# Patient Record
Sex: MALE | Race: WHITE | NOT HISPANIC OR LATINO | Employment: OTHER | URBAN - METROPOLITAN AREA
[De-identification: names, ages, dates, MRNs, and addresses within clinical notes are randomized per-mention and may not be internally consistent; named-entity substitution may affect disease eponyms.]

---

## 2023-01-19 ENCOUNTER — TRANSFERRED RECORDS (OUTPATIENT)
Dept: HEALTH INFORMATION MANAGEMENT | Facility: CLINIC | Age: 78
End: 2023-01-19

## 2023-01-27 ENCOUNTER — TRANSFERRED RECORDS (OUTPATIENT)
Dept: HEALTH INFORMATION MANAGEMENT | Facility: CLINIC | Age: 78
End: 2023-01-27

## 2023-02-22 ENCOUNTER — TRANSFERRED RECORDS (OUTPATIENT)
Dept: HEALTH INFORMATION MANAGEMENT | Facility: CLINIC | Age: 78
End: 2023-02-22

## 2023-03-08 ENCOUNTER — TRANSFERRED RECORDS (OUTPATIENT)
Dept: HEALTH INFORMATION MANAGEMENT | Facility: CLINIC | Age: 78
End: 2023-03-08

## 2023-05-08 ENCOUNTER — TRANSFERRED RECORDS (OUTPATIENT)
Dept: HEALTH INFORMATION MANAGEMENT | Facility: CLINIC | Age: 78
End: 2023-05-08

## 2023-05-12 ENCOUNTER — TRANSFERRED RECORDS (OUTPATIENT)
Dept: HEALTH INFORMATION MANAGEMENT | Facility: CLINIC | Age: 78
End: 2023-05-12

## 2023-05-23 ENCOUNTER — MEDICAL CORRESPONDENCE (OUTPATIENT)
Dept: HEALTH INFORMATION MANAGEMENT | Facility: CLINIC | Age: 78
End: 2023-05-23

## 2023-06-22 ENCOUNTER — TRANSFERRED RECORDS (OUTPATIENT)
Dept: HEALTH INFORMATION MANAGEMENT | Facility: CLINIC | Age: 78
End: 2023-06-22

## 2023-06-30 ENCOUNTER — TRANSFERRED RECORDS (OUTPATIENT)
Dept: HEALTH INFORMATION MANAGEMENT | Facility: CLINIC | Age: 78
End: 2023-06-30

## 2023-07-01 ENCOUNTER — TRANSFERRED RECORDS (OUTPATIENT)
Dept: HEALTH INFORMATION MANAGEMENT | Facility: CLINIC | Age: 78
End: 2023-07-01

## 2023-07-05 ENCOUNTER — TRANSFERRED RECORDS (OUTPATIENT)
Dept: HEALTH INFORMATION MANAGEMENT | Facility: CLINIC | Age: 78
End: 2023-07-05

## 2023-07-26 ENCOUNTER — TRANSFERRED RECORDS (OUTPATIENT)
Dept: HEALTH INFORMATION MANAGEMENT | Facility: CLINIC | Age: 78
End: 2023-07-26

## 2023-07-27 ENCOUNTER — TRANSFERRED RECORDS (OUTPATIENT)
Dept: HEALTH INFORMATION MANAGEMENT | Facility: CLINIC | Age: 78
End: 2023-07-27

## 2023-07-28 ENCOUNTER — TRANSFERRED RECORDS (OUTPATIENT)
Dept: HEALTH INFORMATION MANAGEMENT | Facility: CLINIC | Age: 78
End: 2023-07-28

## 2023-08-14 ENCOUNTER — TRANSFERRED RECORDS (OUTPATIENT)
Dept: HEALTH INFORMATION MANAGEMENT | Facility: CLINIC | Age: 78
End: 2023-08-14

## 2023-09-07 ENCOUNTER — TRANSFERRED RECORDS (OUTPATIENT)
Dept: HEALTH INFORMATION MANAGEMENT | Facility: CLINIC | Age: 78
End: 2023-09-07

## 2023-10-02 ENCOUNTER — TRANSFERRED RECORDS (OUTPATIENT)
Dept: HEALTH INFORMATION MANAGEMENT | Facility: CLINIC | Age: 78
End: 2023-10-02

## 2023-10-06 ENCOUNTER — TRANSFERRED RECORDS (OUTPATIENT)
Dept: HEALTH INFORMATION MANAGEMENT | Facility: CLINIC | Age: 78
End: 2023-10-06

## 2023-11-27 ENCOUNTER — TRANSFERRED RECORDS (OUTPATIENT)
Dept: HEALTH INFORMATION MANAGEMENT | Facility: CLINIC | Age: 78
End: 2023-11-27

## 2023-12-11 ENCOUNTER — TRANSFERRED RECORDS (OUTPATIENT)
Dept: HEALTH INFORMATION MANAGEMENT | Facility: CLINIC | Age: 78
End: 2023-12-11

## 2024-01-10 ENCOUNTER — TRANSFERRED RECORDS (OUTPATIENT)
Dept: HEALTH INFORMATION MANAGEMENT | Facility: CLINIC | Age: 79
End: 2024-01-10

## 2024-01-29 ENCOUNTER — TRANSFERRED RECORDS (OUTPATIENT)
Dept: HEALTH INFORMATION MANAGEMENT | Facility: CLINIC | Age: 79
End: 2024-01-29

## 2024-01-31 ENCOUNTER — TRANSFERRED RECORDS (OUTPATIENT)
Dept: HEALTH INFORMATION MANAGEMENT | Facility: CLINIC | Age: 79
End: 2024-01-31

## 2024-04-03 ENCOUNTER — TRANSFERRED RECORDS (OUTPATIENT)
Dept: HEALTH INFORMATION MANAGEMENT | Facility: CLINIC | Age: 79
End: 2024-04-03
Payer: MEDICARE

## 2024-04-05 ENCOUNTER — MEDICAL CORRESPONDENCE (OUTPATIENT)
Dept: HEALTH INFORMATION MANAGEMENT | Facility: CLINIC | Age: 79
End: 2024-04-05
Payer: MEDICARE

## 2024-04-05 ENCOUNTER — TRANSCRIBE ORDERS (OUTPATIENT)
Dept: OTHER | Age: 79
End: 2024-04-05

## 2024-04-05 DIAGNOSIS — C17.1 MALIGNANT NEOPLASM OF JEJUNUM (H): Primary | ICD-10-CM

## 2024-04-08 ENCOUNTER — PATIENT OUTREACH (OUTPATIENT)
Dept: ONCOLOGY | Facility: CLINIC | Age: 79
End: 2024-04-08

## 2024-04-08 NOTE — PROGRESS NOTES
New Patient Oncology Nurse Navigator Note     Referring provider: Dr Ronald Goldberg, Oncology    Referring Clinic/Organization: Other - [facility]     Referred to: Medical Oncology    Requested provider (if applicable): Dr Fregoso or colleagues    Referral Received: 04/05/24       Evaluation for : colon cancer     Clinical History (per Nurse review of records provided):      * 6/30/2023 ([facility])    CT Abdomen Pelvis w Contrast  Order: [id number]  Impression      1.  The duodenum and proximal jejunum are dilated with a short segment of normal   caliber small bowel in the right lower quadrant before another loop of dilated   small bowel is seen in the left abdomen. There is suggestion of a transition   point in the mid abdomen in the region of small bowel wall thickening with   appearance suggestive of a mass. These findings are concerning for early or   partial small bowel obstruction versus ileus given patient's reported history of   recent GI procedure.   2.  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          * 6/30/2023 exploratory laparotomy with jejunal mass resection, biopsy ([facility])    Addendum     MLH1 methylation:  Detected.     See attached MLH1 promoter methylation analysis report from [employer] for details.   Addendum electronically signed by Christina Patton MD on 10/12/2023 at 1804   Final Diagnosis     A. Mesentery, biopsy:  Adenocarcinoma.     B: Jejunum, SMALL BOWEL MASS, resection:  Adenocarcinoma, SEE COMMENT.  Comment:  The operative note and H&E review and immunohistochemistry are consistent with a small bowel primary. Of note, PMS2, MSH6, MSH2 are all intact, and MLH1 is positive, but an unusual pattern. In light of the location of this carcinoma, and that finding, additional testing has been requested and will be  reported as an addendum.      Electronically signed by Karan Perales MD for Christina Patton MD on 7/7/2023 at 0956         pT Category:    pT3   pN Category:    pN1       * 9/7/2023 US upper left extremity ([facility])  Impression      DVT involving the left brachial vein.     Findings discussed with Dr. Diallo by sonographer, April Page at end of the   exam.     Interpreted By: Debby Rodriguez MD   9/7/2023 5:14 PM                                 * 12/14/2023 MR Abd Pelvis ([place])  Impression    Decreased size of the 1.6 cm left mesenteric mass. No other sites of disease in the abdomen or pelvis.       * 12/15/2023 Pt met / [place] Dr Stanford for 2nd opinion.    ASSESSMENT/PLAN  1. 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 Found to have pMMR/MSI-H disease and switch to immunotherapy. Restaging scan shows response to treatment with out evidence of progression.  - Given MSI-H disease recommendation is to proceed with monthly Nivolumab. Continue restaging scans every 3 months. At the 1-2 year crystal from the start of immunotherapy based on residual disease and PET activity can decide whether immunotherapy can be stopped.  - Given MSI-H disease and response to immunotherapy, reasonable to hold off on surgical resection. Patient would like to defer surgical consult at this time.    Followup: As needed.      * [facility]/2024 [facility]            Clinical Assessment / Barriers to Care (Per Nurse):    Per Dr Goldberg's notes, pt continues on maintenance nivolumab, last dose give 4/4/2024. Pt is moving to [place] at the end of April 2024 & would like to transfer care to Dr Fregoso.    Will offer next available consult w/ Dr Fregoso (5/6/2024) or schedule per pt preference per travel plans.        Records Location: [facility] Everywhere   Faxed - Media tab/Scanned     Records Needed:     Outside imaging from [place] 6/2023 to  present, outside path slides from [place] for colon cancer    Additional testing needed prior to consult:     N/A          Bernardino Reyna, RN, BSN, OCN  Oncology New Patient Nurse Navigator   [facility]  [phone]

## 2024-04-24 ENCOUNTER — TRANSFERRED RECORDS (OUTPATIENT)
Dept: HEALTH INFORMATION MANAGEMENT | Facility: CLINIC | Age: 79
End: 2024-04-24
Payer: MEDICARE

## 2024-05-07 NOTE — TELEPHONE ENCOUNTER
Call to patient and was given results from PCP lesions removed were benign ,no cancer.    Verbalized understanding with .   No further questions.   RECORDS STATUS - ALL OTHER DIAGNOSIS      Referring Provider/Location:  Dr. Ronald Goldberg MD at [facility] [address] [place] [address]. Ph. [phone] Fax. [phone]   Dx and Code: Malignant neoplasm of jejunum (H) [C17.1]   Appt Date:  5.13.24  Provider: Hima Fregoso     Action Taken  Date/Description  TJ     PreVisit May 7, 2024  9:13 AM   Call to Pt and confirmed the following:  Records updated in Care Everywhere: Yes  Has Pt been anywhere else for this condition/concern? Yes  If yes, where?  [facility] (PT unsure of where but believes he is affiliated with [facility])  Prior history of Hematologist, Oncologist: No  Previous Radiation:  No  If yes, when, where and what part of body: NA  Genetic Testing conducted:  Yes around 8/2023 - [facility] or [facility] (Genetic and Genomic)  Previous Surgical procedures or biopsies:  6.30.23  [facility]: [facility]  Any scheduled follow up's/imaging/surgical procedures/biopsies?  No  Any outstanding orders/planning to schedule:  No    Call to [facility] for Medical Records Team to CB  Call to [facility] to obtain fax#  Recording said to call [phone]    Provided F: [phone]  P: [phone]  Address:  [facility] Pathology Dept [address] [place] [address]  [id number]  Call to [facility] -[phone] and obtained Fax   F: [phone]      May 10, 2024 11:19 AM  Message to NN with update on status of records  May 13, 2024 3:00 PM  Call from Angel at [facility] medical records stating the request needs to be faxed to   The reports will be faxed but the images have been done at several locations-[facility] and [facility] are two different facilities   They can give get us the imaging from the PET done on 1.29.24 but need to call [phone].  This is the radiation Oncology Dept but all  imaging is done there. The reports are being faxed over now.  Called and no one is available to talk to  - need to CB  May 14, 2024 10:23 AM Call to Rad Onc and Pt has never been there.  Need to call Dr Goldbergs office at [phone].  Called and this is the same place that called me yesterday.  Called and spoke with Angel again and he gave me a phone number for [facility]  Radiology [phone].  He is going to try and find the location of [place].  Person that answered gave me medical records number of [phone].  This took me back to the same place I was transferred again.  Since the fax number seems to be the same as [place], I just called and LVM for Mitali in  to call me and let me know if she can help.  11:07 AM  CB from Angel and he was able to provide me with a number for Jacquelin who is able to get images on a disc for  me.  I just need to call her and tell her what I need.  The number is [phone]    Call to Jacquelin and she will need a new shipping label:  P: [phone]  F: [phone]  Address:  [facility]   Imaging Dept  [address] [place] [address]    May 16, 2024 2:47 PM  I sent an IB to Pa earlier to day to let her know that I have had multiple failed attempts and am transferring out. Forwarding info to Dr Fregoso     Records Requested     Clinic name Comments/Action Taken   [facility] May 7, 2024 10:35 AM    Faxed request for Operative Report and Pathology Slides   Trk: [phone]  May 9, 2024 1:10 PM  Called and Fax request not received.  Need to re-fax to [phone] (Done)  May 10, 2024 10:43 AM  Called to [facility] and they confirmed path has been sent today  May 13, 2024  Tracking shows en route   [facility]   May 7, 2024 10:08 AM    Faxed request for all records and imaging on a DiCom Disc    New Next Day Trk:  [phone]  Called [phone] and requested imaging dept.  They said Medical records handles  imaging.  P: [phone]    May 10, 2024 Called and only had recording giving a different fax number to send request to.  [phone]. Re-faxed request  May 14, 2024 9:17 AM  Records received (4 faxes); Emailed to NN and faxed to HIM   [facility] May 14, 2024 11:34 AM  Faxed STAT request with next day label for all imaging  Trk:  [phone]  May 16, 2024 10:32 AM  Tracking shows still not shipped   [facility] May 7, 2024  10:56 AM  Faxed request for all imaging on a DiCom Disc  Trk: [phone]  May 10, 2024 10:48 AM  Call to Medical records (they do films) and they have no record of receipt.  Provided same fax I used earlier.  Re-faxed to [phone]  May 14, 2024  Call to follow-up on status of image request.  On hold for 11 minutes   May 16, 2024 10:33 AM Tracking shows still not shipped     RECORDS NEEDED: All per protocol   NOTES STATUS COMMENTS   OFFICE NOTE from referring provider     OFFICE NOTE from medical oncologist [facility] 12.19.23-4.24.24    12.15.23   OFFICE NOTE from other specialist Epic/ - [facility]    DISCHARGE SUMMARY from hospital /[facility] 9.11.23 Neutropenic Fever  6.30.23 Small Bowel Obstruction (See operative report)   DISCHARGE REPORT from the ER     OPERATIVE REPORT [facility] 6.30.23 Laparoscopic/open Laparotomy Jejunal Mass  5.12.23 Upper GI   MEDICATION LIST     LABS     PATHOLOGY REPORTS [facility]  P: [phone] 6.30.23 Case: [id number] Jejunum    ANYTHING RELATED TO DIAGNOSIS [facility] 4.5.24 Most recent labs  9.13.23 Labs   GENONOMIC TESTING     TYPE: [facility]  10.2024 [place]    IMAGING (NEED IMAGES & REPORT)     CT  [facility]  10.26.23 CAP  6.30.23 Abdomen Pelvis   MRI [facility]/[place] 12.14.23 Abdomen  12.14.23 Pelvis   PET [facility]  [facility] 1.29.24 Eyes to Thighs  8.11.23 Eyes to Thighs

## 2024-05-13 ENCOUNTER — PRE VISIT (OUTPATIENT)
Dept: ONCOLOGY | Facility: CLINIC | Age: 79
End: 2024-05-13
Payer: MEDICARE

## 2024-05-13 ENCOUNTER — PATIENT OUTREACH (OUTPATIENT)
Dept: ONCOLOGY | Facility: CLINIC | Age: 79
End: 2024-05-13
Payer: MEDICARE

## 2024-05-13 ENCOUNTER — ONCOLOGY VISIT (OUTPATIENT)
Dept: ONCOLOGY | Facility: CLINIC | Age: 79
End: 2024-05-13
Attending: INTERNAL MEDICINE
Payer: MEDICARE

## 2024-05-13 VITALS
TEMPERATURE: 98.2 F | HEIGHT: 69 IN | DIASTOLIC BLOOD PRESSURE: 81 MMHG | SYSTOLIC BLOOD PRESSURE: 134 MMHG | WEIGHT: 157 LBS | HEART RATE: 75 BPM | RESPIRATION RATE: 16 BRPM | OXYGEN SATURATION: 100 % | BODY MASS INDEX: 23.25 KG/M2

## 2024-05-13 DIAGNOSIS — R15.9 INCONTINENCE OF FECES, UNSPECIFIED FECAL INCONTINENCE TYPE: ICD-10-CM

## 2024-05-13 DIAGNOSIS — C17.1 MALIGNANT NEOPLASM OF JEJUNUM (H): Primary | ICD-10-CM

## 2024-05-13 DIAGNOSIS — E03.9 ACQUIRED HYPOTHYROIDISM: ICD-10-CM

## 2024-05-13 PROBLEM — R06.83 SNORING: Status: ACTIVE | Noted: 2018-03-30

## 2024-05-13 PROBLEM — Z87.442 HISTORY OF RENAL CALCULI: Status: ACTIVE | Noted: 2020-03-27

## 2024-05-13 PROBLEM — I65.29 CAROTID ARTERY STENOSIS: Status: ACTIVE | Noted: 2023-02-12

## 2024-05-13 PROBLEM — K63.89 SMALL BOWEL MASS: Status: ACTIVE | Noted: 2023-07-01

## 2024-05-13 PROBLEM — H52.4 PRESBYOPIA: Status: ACTIVE | Noted: 2018-03-30

## 2024-05-13 PROBLEM — C17.9: Status: ACTIVE | Noted: 2023-07-19

## 2024-05-13 PROBLEM — R97.20 HIGH PROSTATE SPECIFIC ANTIGEN (PSA): Status: ACTIVE | Noted: 2018-03-30

## 2024-05-13 PROBLEM — C17.0: Status: ACTIVE | Noted: 2023-08-04

## 2024-05-13 PROBLEM — R73.03 PREDIABETES: Status: ACTIVE | Noted: 2018-03-30

## 2024-05-13 PROBLEM — H25.10 NUCLEAR SENILE CATARACT: Status: ACTIVE | Noted: 2018-03-30

## 2024-05-13 PROBLEM — Q43.3: Status: ACTIVE | Noted: 2023-06-30

## 2024-05-13 PROCEDURE — 99417 PROLNG OP E/M EACH 15 MIN: CPT | Performed by: INTERNAL MEDICINE

## 2024-05-13 PROCEDURE — G2211 COMPLEX E/M VISIT ADD ON: HCPCS | Performed by: INTERNAL MEDICINE

## 2024-05-13 PROCEDURE — G0463 HOSPITAL OUTPT CLINIC VISIT: HCPCS | Performed by: INTERNAL MEDICINE

## 2024-05-13 PROCEDURE — 99205 OFFICE O/P NEW HI 60 MIN: CPT | Performed by: INTERNAL MEDICINE

## 2024-05-13 RX ORDER — LORAZEPAM 2 MG/ML
0.5 INJECTION INTRAMUSCULAR EVERY 4 HOURS PRN
Status: CANCELLED | OUTPATIENT
Start: 2024-05-22

## 2024-05-13 RX ORDER — HYDROCORTISONE 10 MG/1
15 TABLET ORAL
COMMUNITY
Start: 2023-10-15 | End: 2024-05-13

## 2024-05-13 RX ORDER — FLUOXETINE 10 MG/1
10 CAPSULE ORAL
COMMUNITY
Start: 2022-04-15 | End: 2024-05-13

## 2024-05-13 RX ORDER — LEVOTHYROXINE SODIUM 50 UG/1
50 TABLET ORAL DAILY
Qty: 90 TABLET | Refills: 4 | Status: SHIPPED | OUTPATIENT
Start: 2024-05-13

## 2024-05-13 RX ORDER — HYDROCORTISONE 5 MG/1
5 TABLET ORAL
COMMUNITY
Start: 2023-10-15 | End: 2024-07-15

## 2024-05-13 RX ORDER — DIPHENHYDRAMINE HYDROCHLORIDE 50 MG/ML
50 INJECTION INTRAMUSCULAR; INTRAVENOUS
Status: CANCELLED
Start: 2024-05-22

## 2024-05-13 RX ORDER — HEPARIN SODIUM,PORCINE 10 UNIT/ML
5-20 VIAL (ML) INTRAVENOUS DAILY PRN
Status: CANCELLED | OUTPATIENT
Start: 2024-05-22

## 2024-05-13 RX ORDER — METHYLPREDNISOLONE SODIUM SUCCINATE 125 MG/2ML
125 INJECTION, POWDER, LYOPHILIZED, FOR SOLUTION INTRAMUSCULAR; INTRAVENOUS
Status: CANCELLED
Start: 2024-05-22

## 2024-05-13 RX ORDER — HYDROCORTISONE 5 MG/1
15 TABLET ORAL 2 TIMES DAILY
Qty: 180 TABLET | Refills: 11 | Status: SHIPPED | OUTPATIENT
Start: 2024-05-13

## 2024-05-13 RX ORDER — LEVOTHYROXINE SODIUM 50 UG/1
50 TABLET ORAL
COMMUNITY
Start: 2023-10-15 | End: 2024-05-13

## 2024-05-13 RX ORDER — MEPERIDINE HYDROCHLORIDE 25 MG/ML
25 INJECTION INTRAMUSCULAR; INTRAVENOUS; SUBCUTANEOUS EVERY 30 MIN PRN
Status: CANCELLED | OUTPATIENT
Start: 2024-05-22

## 2024-05-13 RX ORDER — ALBUTEROL SULFATE 0.83 MG/ML
2.5 SOLUTION RESPIRATORY (INHALATION)
Status: CANCELLED | OUTPATIENT
Start: 2024-05-22

## 2024-05-13 RX ORDER — ALBUTEROL SULFATE 90 UG/1
1-2 AEROSOL, METERED RESPIRATORY (INHALATION)
Status: CANCELLED
Start: 2024-05-22

## 2024-05-13 RX ORDER — FLUOXETINE 10 MG/1
10 CAPSULE ORAL DAILY
Qty: 30 CAPSULE | Refills: 11 | Status: SHIPPED | OUTPATIENT
Start: 2024-05-13

## 2024-05-13 RX ORDER — HEPARIN SODIUM (PORCINE) LOCK FLUSH IV SOLN 100 UNIT/ML 100 UNIT/ML
5 SOLUTION INTRAVENOUS
Status: CANCELLED | OUTPATIENT
Start: 2024-05-22

## 2024-05-13 RX ORDER — EPINEPHRINE 1 MG/ML
0.3 INJECTION, SOLUTION INTRAMUSCULAR; SUBCUTANEOUS EVERY 5 MIN PRN
Status: CANCELLED | OUTPATIENT
Start: 2024-05-22

## 2024-05-13 ASSESSMENT — PAIN SCALES - GENERAL: PAINLEVEL: NO PAIN (0)

## 2024-05-13 NOTE — PROGRESS NOTES
Demetrius Espinoza is a new patient transferring care for his small bowel cancer to me as he moved back to [place].    He is 79 years old and presented more than a year ago with decreased exercise tolerance please do the discovery of iron deficiency without a specific cause identified in spite of extensive evaluation of his GI tract.  Then in July 2023 he developed a small bowel obstruction and was found to have an adenocarcinoma in the jejunum which was resected, but he had an associated mesenteric mass, presumably lymph nodes, that were deemed too proximal in the mesentery to be able to be resected.  He was started on treatment with FOLFOX and Avastin which was complicated by neutropenic sepsis.  After 3 cycles molecular profiling returned indicating that he had microsatellite instability and his treatment was switched to ipilimumab/nivolumab and currently is on single agent nivolumab.  On his last evaluation 3 months ago, he had no evidence of disease on PET scanning.  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  (The patient believes his adrenal insufficiency was identified prior to starting his immunotherapy, but the records I have indicate this clearly happened after he began treatment.)  He tells me he also currently has some problems with a dry mouth but not enough to interfere with him eating or in his mind require any intervention.  He notes some nonspecific achiness in his joints and occasional muscle cramps.  He developed a left upper extremity thrombosis for which she is on Eliquis.  He has had no migdalia diarrhea but does note in the last few months he has developed intermittent problems with fecal incontinence which has greatly restricted his activity.  He has not gotten back to his usual level of physical activity for reasons that are not entirely clear to me.  His iron deficiency has been corrected.  He tells me his weight is stable.    His  past medical history is otherwise relatively unremarkable.    He does not have any significant family history    He is a retired  from the [employer].  His daughter and granddaughter live here in the [facility] [place] [facility] and is just moved back to be close to them.  He is a lifelong non-smoker.    On exam he appears his stated age with a somewhat flat affect.  His vital signs are unremarkable.  He has no icterus.  He has no respiratory distress.  His speech is slow and his cranial nerves are grossly intact.    His outside imaging studies are not yet available for my review.  The reports indicate that in December and MRI showed reduction in size of the mass at the base of the mesentery and on January 29 of this year a PET/CT showed no evidence of disease.    Outside labs from a couple weeks ago show relatively normal blood counts with mild normocytic anemia.  His bilirubin, albumin liver enzymes were normal.  His electrolytes and renal function were normal.  A T4 was normal with a low TSH.    Molecular profiling on cell free DNA showed his tumor to have a high tumor mutational burden consistent with microsatellite instability, deficient MLH1, an NRAS mutation, and an NTRK fusion event.  Germline mutation testing showed no evidence of a familial cancer syndrome.    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  The patient's had some significant immune related toxicities including hypophysitis for which she is on replacement therapy and possibly xerostomia.  Neither of these necessitate withholding his immunotherapy.  He appears to be an adequate replacement doses of steroids and thyroid.  His functional status is still somewhat diminished by his illness but he seems to be improving.  We can continue on with his current nivolumab.  He is due for another  response assessment and we will get him set up for a CT scan.  Will plan on another assessment of his disease status in a few months.  Will require ongoing monitoring of his immune related toxicities with appropriate hormone replacement.  I do not think his fecal incontinence is related to his current therapy or disease and we have made a referral to colorectal for further evaluation of that as its become a significant functional problem for him.    Total time by me inclusive of the above visit with the patient, review of extensive outside records, ordering and coordination of care, and documentation was approximately 90 minutes.

## 2024-05-13 NOTE — LETTER
5/13/2024         RE: Demetrius Espinoza  [address] [facility] [address]        Dear Colleague,    Thank you for referring your patient, Demetrius Espinoza, to the [facility]. Please see a copy of my visit note below.      Demetrius Espinoza is a new patient transferring care for his small bowel cancer to me as he moved back to [place].    He is 79 years old and presented more than a year ago with decreased exercise tolerance please do the discovery of iron deficiency without a specific cause identified in spite of extensive evaluation of his GI tract.  Then in July 2023 he developed a small bowel obstruction and was found to have an adenocarcinoma in the jejunum which was resected, but he had an associated mesenteric mass, presumably lymph nodes, that were deemed too proximal in the mesentery to be able to be resected.  He was started on treatment with FOLFOX and Avastin which was complicated by neutropenic sepsis.  After 3 cycles molecular profiling returned indicating that he had microsatellite instability and his treatment was switched to ipilimumab/nivolumab and currently is on single agent nivolumab.  On his last evaluation 3 months ago, he had no evidence of disease on PET scanning.  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  (The patient believes his adrenal insufficiency was identified prior to starting his immunotherapy, but the records I have indicate this clearly happened after he began treatment.)  He tells me he also currently has some problems with a dry mouth but not enough to interfere with him eating or in his mind require any intervention.  He notes some nonspecific achiness in his joints and occasional muscle cramps.  He developed a left upper extremity thrombosis for which she is on Eliquis.  He has had no migdalia diarrhea but does note in the last few months he has developed intermittent problems with  fecal incontinence which has greatly restricted his activity.  He has not gotten back to his usual level of physical activity for reasons that are not entirely clear to me.  His iron deficiency has been corrected.  He tells me his weight is stable.    His past medical history is otherwise relatively unremarkable.    He does not have any significant family history    He is a retired  from the [employer].  His daughter and granddaughter live here in the [facility] and is just moved back to be close to them.  He is a lifelong non-smoker.    On exam he appears his stated age with a somewhat flat affect.  His vital signs are unremarkable.  He has no icterus.  He has no respiratory distress.  His speech is slow and his cranial nerves are grossly intact.    His outside imaging studies are not yet available for my review.  The reports indicate that in December and MRI showed reduction in size of the mass at the base of the mesentery and on January 29 of this year a PET/CT showed no evidence of disease.    Outside labs from a couple weeks ago show relatively normal blood counts with mild normocytic anemia.  His bilirubin, albumin liver enzymes were normal.  His electrolytes and renal function were normal.  A T4 was normal with a low TSH.    Molecular profiling on cell free DNA showed his tumor to have a high tumor mutational burden consistent with microsatellite instability, deficient MLH1, an NRAS mutation, and an NTRK fusion event.  Germline mutation testing showed no evidence of a familial cancer syndrome.    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  The patient's had some significant immune related toxicities including hypophysitis for which she is on replacement therapy and possibly xerostomia.  Neither of these necessitate withholding his  immunotherapy.  He appears to be an adequate replacement doses of steroids and thyroid.  His functional status is still somewhat diminished by his illness but he seems to be improving.  We can continue on with his current nivolumab.  He is due for another response assessment and we will get him set up for a CT scan.  Will plan on another assessment of his disease status in a few months.  Will require ongoing monitoring of his immune related toxicities with appropriate hormone replacement.  I do not think his fecal incontinence is related to his current therapy or disease and we have made a referral to colorectal for further evaluation of that as its become a significant functional problem for him.    Total time by me inclusive of the above visit with the patient, review of extensive outside records, ordering and coordination of care, and documentation was approximately 90 minutes.                Again, thank you for allowing me to participate in the care of your patient.        Sincerely,        Hima Fregoso MD

## 2024-05-13 NOTE — PROGRESS NOTES
[facility]: Cancer Care Initial Note                                    Discussion with Patient:                                                      Introduced self and role of RN Care Coordinator at [facility]. Provided my contact information, [facility] phone number (which has options to talk with a Nurse available 24/7 - triage and RNCC via this option during business hours).     Reviewed current clinic flow including telemedicine visits and RNCCs working remotely at varying intervals. Reviewed appropriate use of mychart and reinforced to not send symptoms through mychart.      Reviewed [facility] care team members including midlevel providers in oncology dept and Dr. Fregoso's usual clinic hours.    Pt voiced understanding and appreciation of above information and denies any further questions, and he understands that I will follow and provide coordination as needed       Goals          General    Other     Notes - Note created  5/13/2024  4:29 PM by Reena López RN    Goal Statement: I will use my clinic and care team resources as directed.  Date Goal set: 5/13/2024  Barriers: disease burden and newly relocated  Strengths: support and involvement with care team  Date to Achieve By: ongoing  Patient expressed understanding of goal: Yes  Action steps to achieve this goal:  I will contact triage with new, worsening or uncontrolled symptoms.   I will call with difficulties of scheduling and/or transportation.   I will not send urgent or symptomatic messages through mychart.   I will contact scheduling to arrange or make changes in my appointments.                Assessment:                                                      Initial  Current living arrangement:: I live in a private home with spouse  Informal Support system:: Family  Equipment Currently Used at Home: none  Mobility Status: Independent  Transportation means:: Regular car    Intervention/Education provided during outreach:                                                        Patient to follow up as scheduled at next appt  Patient to call/Photetic[employer]art message with updates  Confirmed patient has clinic and triage numbers      Reena López RN, BSN, OCN   RN Care Coordinator   [facility]

## 2024-05-13 NOTE — NURSING NOTE
"Oncology Rooming Note    May 13, 2024 2:56 PM   Demetrius Espinoza is a 79 year old male who presents for:    Chief Complaint   Patient presents with    Oncology Clinic Visit     Malignant neoplasm of jejunum     Initial Vitals: /81 (BP Location: Right arm, Patient Position: Sitting, Cuff Size: Adult Regular)   Pulse 75   Temp 98.2  F (36.8  C) (Oral)   Resp 16   Ht 1.745 m (5' 8.7\")   Wt 71.2 kg (157 lb)   SpO2 100%   BMI 23.39 kg/m   Estimated body mass index is 23.39 kg/m  as calculated from the following:    Height as of this encounter: 1.745 m (5' 8.7\").    Weight as of this encounter: 71.2 kg (157 lb). Body surface area is 1.86 meters squared.  No Pain (0) Comment: Data Unavailable   No LMP for male patient.  Allergies reviewed: Yes  Medications reviewed: Yes    Medications: Medication refills not needed today.  Pharmacy name entered into [facility]: [id number] [place] [address]    Frailty Screening:   Is the patient here for a new oncology consult visit in cancer care? 1. Yes. Over the past month, have you experienced difficulty or required a caregiver to assist with:   1. Balance, walking or general mobility (including any falls)? NO  2. Completion of self-care tasks such as bathing, dressing, toileting, grooming/hygiene?  NO  3. Concentration or memory that affects your daily life?  NO       Clinical concerns: none       Tia Martin              "

## 2024-05-15 NOTE — TELEPHONE ENCOUNTER
Diagnosis, Referred by & from: Incontinence of Feces   Appt date: 8/5/2024   NOTES STATUS DETAILS   OFFICE NOTE from referring provider Internal [facility]:  5/13/24 - ONC OV with Dr. Fregoso   OFFICE NOTE from other specialist Care Everywhere / Received [facility]:  6/19/24 - Infusion    [facility]:  4/24/24 - ONC OV with Tegan Thompson NP    [facility]:  12/15/23 - HEM ONC OV with Dr. Stanford   DISCHARGE SUMMARY from hospital Care Everywhere [facility]:  9/11/23 - Admission with Dr. Yoo  6/30/23 - Admission with Dr. Morfin   DISCHARGE REPORT from the ER N/A    OPERATIVE REPORT Care Everywhere [facility]:  6/30/23 - OP Note for LAPAROSCOPIC CONVERTED TO OPEN LAPAROTOMY, RESECTION JEJUNAL MASS WITH ANASTOMOSIS with Dr. Young   MEDICATION LIST Internal    LABS     ANAL PAP/CEA Care Everywhere [facility]:  7/1/23 - CEA   DIAGNOSTIC PROCEDURES     COLONOSCOPY (most recent all time after 5 years) Received [facility]:  5/12/23 - Colonoscopy   UPPER ENDOSCOPY (EGD) Received [facility]:  5/12/23 - EGD   IMAGING (DISC & REPORT)      CT Received / Internal [facility]:  5/21/24 - CT Chest/Abd/Pelvis    [facility]:  1/29/24 - PET/CT  1/29/24 - CT Abd/Pelvis  7/28/23 - PET/CT    [facility]:  6/30/23 - CT Abd/pelvis   MRI Received [facility]:  12/14/23 - MRI Abdomen  12/14/23 - MRI Pelvis     Records Requested  05/15/24    Facility  [facility]  [facility]   Outcome * 5/15/24 11:32 AM Imaging being requested by Oncology team, will follow up that they have arrived at later date. - Tegan    * 6/3/24 8:03 AM Images received from the oncology team and attached to the patient in PACs. - Tegan

## 2024-05-15 NOTE — TELEPHONE ENCOUNTER
Action 05/17/24 10:32 AM   Action Taken  Imaging discs received from [facility] and sent to [facility] to be uploaded into PACs. - Tegan     Action May 15, 2024 1:46 PM    Action Taken Slides from [facility] received and taken to 5th floor path lab for review.

## 2024-05-16 ENCOUNTER — DOCUMENTATION ONLY (OUTPATIENT)
Dept: ONCOLOGY | Facility: CLINIC | Age: 79
End: 2024-05-16
Payer: MEDICARE

## 2024-05-16 ENCOUNTER — LAB REQUISITION (OUTPATIENT)
Dept: LAB | Facility: CLINIC | Age: 79
End: 2024-05-16
Payer: MEDICARE

## 2024-05-16 PROCEDURE — 88321 CONSLTJ&REPRT SLD PREP ELSWR: CPT | Performed by: PATHOLOGY

## 2024-05-20 ENCOUNTER — INFUSION THERAPY VISIT (OUTPATIENT)
Dept: ONCOLOGY | Facility: CLINIC | Age: 79
End: 2024-05-20
Attending: INTERNAL MEDICINE
Payer: MEDICARE

## 2024-05-20 ENCOUNTER — APPOINTMENT (OUTPATIENT)
Dept: LAB | Facility: CLINIC | Age: 79
End: 2024-05-20
Attending: INTERNAL MEDICINE
Payer: MEDICARE

## 2024-05-20 VITALS
SYSTOLIC BLOOD PRESSURE: 147 MMHG | WEIGHT: 159.1 LBS | TEMPERATURE: 98.2 F | DIASTOLIC BLOOD PRESSURE: 82 MMHG | OXYGEN SATURATION: 99 % | HEART RATE: 77 BPM | RESPIRATION RATE: 16 BRPM | BODY MASS INDEX: 23.7 KG/M2

## 2024-05-20 DIAGNOSIS — C17.1 MALIGNANT NEOPLASM OF JEJUNUM (H): Primary | ICD-10-CM

## 2024-05-20 LAB
ALBUMIN SERPL BCG-MCNC: 4 G/DL (ref 3.5–5.2)
ALP SERPL-CCNC: 55 U/L (ref 40–150)
ALT SERPL W P-5'-P-CCNC: 20 U/L (ref 0–70)
ANION GAP SERPL CALCULATED.3IONS-SCNC: 9 MMOL/L (ref 7–15)
AST SERPL W P-5'-P-CCNC: 24 U/L (ref 0–45)
BILIRUB SERPL-MCNC: 0.4 MG/DL
BUN SERPL-MCNC: 18.8 MG/DL (ref 8–23)
CALCIUM SERPL-MCNC: 8.6 MG/DL (ref 8.8–10.2)
CHLORIDE SERPL-SCNC: 103 MMOL/L (ref 98–107)
CREAT SERPL-MCNC: 0.97 MG/DL (ref 0.67–1.17)
DEPRECATED HCO3 PLAS-SCNC: 26 MMOL/L (ref 22–29)
EGFRCR SERPLBLD CKD-EPI 2021: 80 ML/MIN/1.73M2
GLUCOSE SERPL-MCNC: 140 MG/DL (ref 70–99)
POTASSIUM SERPL-SCNC: 3.9 MMOL/L (ref 3.4–5.3)
PROT SERPL-MCNC: 6.2 G/DL (ref 6.4–8.3)
SODIUM SERPL-SCNC: 138 MMOL/L (ref 135–145)
T4 FREE SERPL-MCNC: 1.09 NG/DL (ref 0.9–1.7)
TSH SERPL DL<=0.005 MIU/L-ACNC: 0.23 UIU/ML (ref 0.3–4.2)

## 2024-05-20 PROCEDURE — 96413 CHEMO IV INFUSION 1 HR: CPT

## 2024-05-20 PROCEDURE — 258N000003 HC RX IP 258 OP 636: Performed by: INTERNAL MEDICINE

## 2024-05-20 PROCEDURE — 84443 ASSAY THYROID STIM HORMONE: CPT | Performed by: INTERNAL MEDICINE

## 2024-05-20 PROCEDURE — 250N000011 HC RX IP 250 OP 636: Mod: JZ | Performed by: INTERNAL MEDICINE

## 2024-05-20 PROCEDURE — 82040 ASSAY OF SERUM ALBUMIN: CPT | Performed by: INTERNAL MEDICINE

## 2024-05-20 PROCEDURE — 36591 DRAW BLOOD OFF VENOUS DEVICE: CPT | Performed by: INTERNAL MEDICINE

## 2024-05-20 PROCEDURE — 84439 ASSAY OF FREE THYROXINE: CPT | Performed by: INTERNAL MEDICINE

## 2024-05-20 RX ORDER — HEPARIN SODIUM (PORCINE) LOCK FLUSH IV SOLN 100 UNIT/ML 100 UNIT/ML
500 SOLUTION INTRAVENOUS ONCE
Status: COMPLETED | OUTPATIENT
Start: 2024-05-20 | End: 2024-05-20

## 2024-05-20 RX ORDER — HEPARIN SODIUM (PORCINE) LOCK FLUSH IV SOLN 100 UNIT/ML 100 UNIT/ML
5 SOLUTION INTRAVENOUS
Status: DISCONTINUED | OUTPATIENT
Start: 2024-05-20 | End: 2024-05-20 | Stop reason: HOSPADM

## 2024-05-20 RX ADMIN — Medication 5 ML: at 17:30

## 2024-05-20 RX ADMIN — SODIUM CHLORIDE 480 MG: 9 INJECTION, SOLUTION INTRAVENOUS at 16:51

## 2024-05-20 RX ADMIN — SODIUM CHLORIDE 250 ML: 9 INJECTION, SOLUTION INTRAVENOUS at 16:50

## 2024-05-20 RX ADMIN — Medication 500 UNITS: at 15:04

## 2024-05-20 NOTE — NURSING NOTE
Chief Complaint   Patient presents with    Port Draw     Labs drawn via port by RN in lab     Port accessed with 20 gauge, 3/4 inch flat needle by RN, labs collected, line flushed with saline and heparin.  Vitals taken. Pt checked in for appointment(s).     Trini Aguilar, RN

## 2024-05-20 NOTE — PROGRESS NOTES
Infusion Nursing Note:  Demetrius Espinoza presents today for C1D1: Nivolumab.    Patient seen by provider today: No   present during visit today: Not Applicable.    Note: Patient is new to our clinic but has received Nivolumab in [place], [place] in the past.  Per patient report, his last Nivolumab infusion occurred on 4/24/24. Oriented to infusion center. Nivolumab teaching, side effects, and schedule reviewed with patient. Pt instructed to call care coordinator, triage (or MD on call if after hours/weekends) with chills/temp >=100.5, questions/concerns. Pt stated understanding of plan.        Intravenous Access:  Implanted Port.    Treatment Conditions:  Lab Results   Component Value Date     05/20/2024    POTASSIUM 3.9 05/20/2024    CR 0.97 05/20/2024    YAMILEX 8.6 (L) 05/20/2024    BILITOTAL 0.4 05/20/2024    ALBUMIN 4.0 05/20/2024    ALT 20 05/20/2024    AST 24 05/20/2024       Results reviewed, labs MET treatment parameters, ok to proceed with treatment.      Post Infusion Assessment:  Patient tolerated infusion without incident.  Blood return noted pre and post infusion.  Site patent and intact, free from redness, edema or discomfort.  No evidence of extravasations.  Access discontinued per protocol.       Discharge Plan:   Patient declined prescription refills.  Discharge instructions reviewed with: Patient.  Patient and/or family verbalized understanding of discharge instructions and all questions answered.  AVS to patient via [employer].  Patient will return 5/21/24 for a CT scan and on 6/19/24 for labs and C2D1: Nivolumab for next appointment.   Patient discharged in stable condition accompanied by: self.  Departure Mode: Ambulatory.      Tiffany Mchugh RN

## 2024-05-21 ENCOUNTER — ANCILLARY PROCEDURE (OUTPATIENT)
Dept: CT IMAGING | Facility: CLINIC | Age: 79
End: 2024-05-21
Attending: INTERNAL MEDICINE
Payer: MEDICARE

## 2024-05-21 DIAGNOSIS — C17.1 MALIGNANT NEOPLASM OF JEJUNUM (H): ICD-10-CM

## 2024-05-21 DIAGNOSIS — E03.9 ACQUIRED HYPOTHYROIDISM: ICD-10-CM

## 2024-05-21 PROCEDURE — 71260 CT THORAX DX C+: CPT | Performed by: RADIOLOGY

## 2024-05-21 PROCEDURE — 74177 CT ABD & PELVIS W/CONTRAST: CPT | Performed by: RADIOLOGY

## 2024-05-21 RX ORDER — HEPARIN SODIUM (PORCINE) LOCK FLUSH IV SOLN 100 UNIT/ML 100 UNIT/ML
500 SOLUTION INTRAVENOUS ONCE
Status: COMPLETED | OUTPATIENT
Start: 2024-05-21 | End: 2024-05-21

## 2024-05-21 RX ORDER — IOPAMIDOL 755 MG/ML
84 INJECTION, SOLUTION INTRAVASCULAR ONCE
Status: COMPLETED | OUTPATIENT
Start: 2024-05-21 | End: 2024-05-21

## 2024-05-21 RX ADMIN — IOPAMIDOL 84 ML: 755 INJECTION, SOLUTION INTRAVASCULAR at 10:15

## 2024-05-21 RX ADMIN — HEPARIN SODIUM (PORCINE) LOCK FLUSH IV SOLN 100 UNIT/ML 500 UNITS: 100 SOLUTION at 10:29

## 2024-05-21 NOTE — DISCHARGE INSTRUCTIONS

## 2024-05-24 LAB
PATH REPORT.COMMENTS IMP SPEC: ABNORMAL
PATH REPORT.COMMENTS IMP SPEC: ABNORMAL
PATH REPORT.COMMENTS IMP SPEC: YES
PATH REPORT.FINAL DX SPEC: ABNORMAL
PATH REPORT.GROSS SPEC: ABNORMAL
PATH REPORT.MICROSCOPIC SPEC OTHER STN: ABNORMAL
PATH REPORT.RELEVANT HX SPEC: ABNORMAL
PATH REPORT.RELEVANT HX SPEC: ABNORMAL
PATH REPORT.SITE OF ORIGIN SPEC: ABNORMAL

## 2024-05-28 ENCOUNTER — PATIENT OUTREACH (OUTPATIENT)
Dept: ONCOLOGY | Facility: CLINIC | Age: 79
End: 2024-05-28
Payer: MEDICARE

## 2024-05-28 NOTE — PROGRESS NOTES
[facility]: Cancer Care                                                                                          Received 2 voicemail's from patient asking to review CT report from last week. Epic chat sent to Dr Fregoso with request to speak with patient.     RNCC also placed call to patient to review report. Together we reviewed CT report. Discussed likely will continue to watch lung nodules over next several scans. We also reviewed Dr Fregoso may decide to had bone scan with next imaging cycle, as suggested by radiologist to determine if sclerotic lesions represent bone mets. Patient voiced understanding. Reports all his questions are currently answered.       Reena López RN, BSN, OCN   RN Care Coordinator   [facility]

## 2024-06-01 ENCOUNTER — HEALTH MAINTENANCE LETTER (OUTPATIENT)
Age: 79
End: 2024-06-01

## 2024-06-17 DIAGNOSIS — C17.1 MALIGNANT NEOPLASM OF JEJUNUM (H): Primary | ICD-10-CM

## 2024-06-17 RX ORDER — MEPERIDINE HYDROCHLORIDE 25 MG/ML
25 INJECTION INTRAMUSCULAR; INTRAVENOUS; SUBCUTANEOUS EVERY 30 MIN PRN
Status: CANCELLED | OUTPATIENT
Start: 2024-06-19

## 2024-06-17 RX ORDER — METHYLPREDNISOLONE SODIUM SUCCINATE 125 MG/2ML
125 INJECTION, POWDER, LYOPHILIZED, FOR SOLUTION INTRAMUSCULAR; INTRAVENOUS
Status: CANCELLED
Start: 2024-06-19

## 2024-06-17 RX ORDER — DIPHENHYDRAMINE HYDROCHLORIDE 50 MG/ML
50 INJECTION INTRAMUSCULAR; INTRAVENOUS
Status: CANCELLED
Start: 2024-06-19

## 2024-06-17 RX ORDER — LORAZEPAM 2 MG/ML
0.5 INJECTION INTRAMUSCULAR EVERY 4 HOURS PRN
Status: CANCELLED | OUTPATIENT
Start: 2024-06-19

## 2024-06-17 RX ORDER — ALBUTEROL SULFATE 90 UG/1
1-2 AEROSOL, METERED RESPIRATORY (INHALATION)
Status: CANCELLED
Start: 2024-06-19

## 2024-06-17 RX ORDER — EPINEPHRINE 1 MG/ML
0.3 INJECTION, SOLUTION INTRAMUSCULAR; SUBCUTANEOUS EVERY 5 MIN PRN
Status: CANCELLED | OUTPATIENT
Start: 2024-06-19

## 2024-06-17 RX ORDER — HEPARIN SODIUM,PORCINE 10 UNIT/ML
5-20 VIAL (ML) INTRAVENOUS DAILY PRN
Status: CANCELLED | OUTPATIENT
Start: 2024-06-19

## 2024-06-17 RX ORDER — HEPARIN SODIUM (PORCINE) LOCK FLUSH IV SOLN 100 UNIT/ML 100 UNIT/ML
5 SOLUTION INTRAVENOUS
Status: CANCELLED | OUTPATIENT
Start: 2024-06-19

## 2024-06-17 RX ORDER — ALBUTEROL SULFATE 0.83 MG/ML
2.5 SOLUTION RESPIRATORY (INHALATION)
Status: CANCELLED | OUTPATIENT
Start: 2024-06-19

## 2024-06-19 ENCOUNTER — LAB (OUTPATIENT)
Dept: LAB | Facility: CLINIC | Age: 79
End: 2024-06-19
Attending: INTERNAL MEDICINE
Payer: MEDICARE

## 2024-06-19 ENCOUNTER — INFUSION THERAPY VISIT (OUTPATIENT)
Dept: ONCOLOGY | Facility: CLINIC | Age: 79
End: 2024-06-19
Attending: INTERNAL MEDICINE
Payer: MEDICARE

## 2024-06-19 VITALS
WEIGHT: 160.6 LBS | OXYGEN SATURATION: 97 % | DIASTOLIC BLOOD PRESSURE: 72 MMHG | SYSTOLIC BLOOD PRESSURE: 108 MMHG | BODY MASS INDEX: 23.92 KG/M2 | HEART RATE: 80 BPM | TEMPERATURE: 98.5 F | RESPIRATION RATE: 16 BRPM

## 2024-06-19 DIAGNOSIS — E03.9 ACQUIRED HYPOTHYROIDISM: ICD-10-CM

## 2024-06-19 DIAGNOSIS — C17.1 MALIGNANT NEOPLASM OF JEJUNUM (H): ICD-10-CM

## 2024-06-19 DIAGNOSIS — C17.1 MALIGNANT NEOPLASM OF JEJUNUM (H): Primary | ICD-10-CM

## 2024-06-19 LAB
ALBUMIN SERPL BCG-MCNC: 4 G/DL (ref 3.5–5.2)
ALP SERPL-CCNC: 54 U/L (ref 40–150)
ALT SERPL W P-5'-P-CCNC: 18 U/L (ref 0–70)
ANION GAP SERPL CALCULATED.3IONS-SCNC: 10 MMOL/L (ref 7–15)
AST SERPL W P-5'-P-CCNC: 25 U/L (ref 0–45)
BASOPHILS # BLD AUTO: 0.1 10E3/UL (ref 0–0.2)
BASOPHILS NFR BLD AUTO: 1 %
BILIRUB SERPL-MCNC: 0.5 MG/DL
BUN SERPL-MCNC: 18.2 MG/DL (ref 8–23)
CALCIUM SERPL-MCNC: 8.9 MG/DL (ref 8.8–10.2)
CHLORIDE SERPL-SCNC: 101 MMOL/L (ref 98–107)
CREAT SERPL-MCNC: 1.09 MG/DL (ref 0.67–1.17)
DEPRECATED HCO3 PLAS-SCNC: 28 MMOL/L (ref 22–29)
EGFRCR SERPLBLD CKD-EPI 2021: 69 ML/MIN/1.73M2
EOSINOPHIL # BLD AUTO: 0 10E3/UL (ref 0–0.7)
EOSINOPHIL NFR BLD AUTO: 1 %
ERYTHROCYTE [DISTWIDTH] IN BLOOD BY AUTOMATED COUNT: 13.5 % (ref 10–15)
GLUCOSE SERPL-MCNC: 118 MG/DL (ref 70–99)
HCT VFR BLD AUTO: 38.6 % (ref 40–53)
HGB BLD-MCNC: 12.8 G/DL (ref 13.3–17.7)
HOLD SPECIMEN: NORMAL
IMM GRANULOCYTES # BLD: 0 10E3/UL
IMM GRANULOCYTES NFR BLD: 1 %
LYMPHOCYTES # BLD AUTO: 1.2 10E3/UL (ref 0.8–5.3)
LYMPHOCYTES NFR BLD AUTO: 19 %
MCH RBC QN AUTO: 28.6 PG (ref 26.5–33)
MCHC RBC AUTO-ENTMCNC: 33.2 G/DL (ref 31.5–36.5)
MCV RBC AUTO: 86 FL (ref 78–100)
MONOCYTES # BLD AUTO: 0.6 10E3/UL (ref 0–1.3)
MONOCYTES NFR BLD AUTO: 9 %
NEUTROPHILS # BLD AUTO: 4.4 10E3/UL (ref 1.6–8.3)
NEUTROPHILS NFR BLD AUTO: 69 %
NRBC # BLD AUTO: 0 10E3/UL
NRBC BLD AUTO-RTO: 0 /100
PLATELET # BLD AUTO: 187 10E3/UL (ref 150–450)
POTASSIUM SERPL-SCNC: 4 MMOL/L (ref 3.4–5.3)
PROT SERPL-MCNC: 6 G/DL (ref 6.4–8.3)
RBC # BLD AUTO: 4.48 10E6/UL (ref 4.4–5.9)
SODIUM SERPL-SCNC: 139 MMOL/L (ref 135–145)
TSH SERPL DL<=0.005 MIU/L-ACNC: 0.32 UIU/ML (ref 0.3–4.2)
WBC # BLD AUTO: 6.3 10E3/UL (ref 4–11)

## 2024-06-19 PROCEDURE — 80053 COMPREHEN METABOLIC PANEL: CPT | Performed by: INTERNAL MEDICINE

## 2024-06-19 PROCEDURE — 85025 COMPLETE CBC W/AUTO DIFF WBC: CPT

## 2024-06-19 PROCEDURE — 258N000003 HC RX IP 258 OP 636: Mod: JZ | Performed by: INTERNAL MEDICINE

## 2024-06-19 PROCEDURE — 36591 DRAW BLOOD OFF VENOUS DEVICE: CPT | Performed by: INTERNAL MEDICINE

## 2024-06-19 PROCEDURE — 96413 CHEMO IV INFUSION 1 HR: CPT

## 2024-06-19 PROCEDURE — 84443 ASSAY THYROID STIM HORMONE: CPT | Performed by: INTERNAL MEDICINE

## 2024-06-19 PROCEDURE — 250N000011 HC RX IP 250 OP 636: Mod: JZ | Performed by: INTERNAL MEDICINE

## 2024-06-19 RX ORDER — HEPARIN SODIUM (PORCINE) LOCK FLUSH IV SOLN 100 UNIT/ML 100 UNIT/ML
5 SOLUTION INTRAVENOUS
Status: COMPLETED | OUTPATIENT
Start: 2024-06-19 | End: 2024-06-19

## 2024-06-19 RX ORDER — HEPARIN SODIUM (PORCINE) LOCK FLUSH IV SOLN 100 UNIT/ML 100 UNIT/ML
5 SOLUTION INTRAVENOUS
Status: DISCONTINUED | OUTPATIENT
Start: 2024-06-19 | End: 2024-06-19 | Stop reason: HOSPADM

## 2024-06-19 RX ADMIN — SODIUM CHLORIDE 250 ML: 9 INJECTION, SOLUTION INTRAVENOUS at 15:50

## 2024-06-19 RX ADMIN — Medication 5 ML: at 16:22

## 2024-06-19 RX ADMIN — SODIUM CHLORIDE 480 MG: 9 INJECTION, SOLUTION INTRAVENOUS at 15:50

## 2024-06-19 RX ADMIN — Medication 5 ML: at 14:24

## 2024-06-19 ASSESSMENT — PAIN SCALES - GENERAL: PAINLEVEL: NO PAIN (0)

## 2024-06-19 NOTE — NURSING NOTE
"Chief Complaint   Patient presents with    Port Draw     Labs drawn from port by rn.  VS taken.     Port accessed with 20 gauge 3/4\" SafeStep needle and labs drawn by rn.  Port flushed with NS and heparin.  Pt tolerated well.  VS taken.  Pt checked in for next appt.    Brenna Oliver RN      "

## 2024-06-19 NOTE — PROGRESS NOTES
Infusion Nursing Note:  Demetrius Espinoza presents today for Cycle 2, Day 1 Keytruda.    Patient seen by provider today: No   present during visit today: Not Applicable.    Note: Pt assessed upon arrival to infusion suite. Denies fever, chills, cough, SOB or other signs/symptoms of infection. Eating/drinking adequately and denies any problems with diarrhea/constipation/or urinary issues. No concerns today.     Intravenous Access:  Implanted Port.    Treatment Conditions:  Lab Results   Component Value Date     06/19/2024    POTASSIUM 4.0 06/19/2024    CR 1.09 06/19/2024    YAMILEX 8.9 06/19/2024    BILITOTAL 0.5 06/19/2024    ALBUMIN 4.0 06/19/2024    ALT 18 06/19/2024    AST 25 06/19/2024       Results reviewed, labs MET treatment parameters, ok to proceed with treatment.      Post Infusion Assessment:  Patient tolerated infusion without incident.  Blood return noted pre and post infusion.  Site patent and intact, free from redness, edema or discomfort.  No evidence of extravasations.  Access discontinued per protocol.       Discharge Plan:   Patient declined prescription refills.  AVS to patient via [employer].  Patient will return 7/15/24 for next appointment.   Patient discharged in stable condition accompanied by: self.  Departure Mode: Ambulatory.      Asmita Weintsein RN

## 2024-07-14 RX ORDER — HEPARIN SODIUM (PORCINE) LOCK FLUSH IV SOLN 100 UNIT/ML 100 UNIT/ML
5 SOLUTION INTRAVENOUS
Status: CANCELLED | OUTPATIENT
Start: 2024-07-17

## 2024-07-14 RX ORDER — MEPERIDINE HYDROCHLORIDE 25 MG/ML
25 INJECTION INTRAMUSCULAR; INTRAVENOUS; SUBCUTANEOUS EVERY 30 MIN PRN
Status: CANCELLED | OUTPATIENT
Start: 2024-07-17

## 2024-07-14 RX ORDER — DIPHENHYDRAMINE HYDROCHLORIDE 50 MG/ML
50 INJECTION INTRAMUSCULAR; INTRAVENOUS
Status: CANCELLED
Start: 2024-07-17

## 2024-07-14 RX ORDER — METHYLPREDNISOLONE SODIUM SUCCINATE 125 MG/2ML
125 INJECTION, POWDER, LYOPHILIZED, FOR SOLUTION INTRAMUSCULAR; INTRAVENOUS
Status: CANCELLED
Start: 2024-07-17

## 2024-07-14 RX ORDER — EPINEPHRINE 1 MG/ML
0.3 INJECTION, SOLUTION INTRAMUSCULAR; SUBCUTANEOUS EVERY 5 MIN PRN
Status: CANCELLED | OUTPATIENT
Start: 2024-07-17

## 2024-07-14 RX ORDER — ALBUTEROL SULFATE 0.83 MG/ML
2.5 SOLUTION RESPIRATORY (INHALATION)
Status: CANCELLED | OUTPATIENT
Start: 2024-07-17

## 2024-07-14 RX ORDER — LORAZEPAM 2 MG/ML
0.5 INJECTION INTRAMUSCULAR EVERY 4 HOURS PRN
Status: CANCELLED | OUTPATIENT
Start: 2024-07-17

## 2024-07-14 RX ORDER — HEPARIN SODIUM,PORCINE 10 UNIT/ML
5-20 VIAL (ML) INTRAVENOUS DAILY PRN
Status: CANCELLED | OUTPATIENT
Start: 2024-07-17

## 2024-07-14 RX ORDER — ALBUTEROL SULFATE 90 UG/1
1-2 AEROSOL, METERED RESPIRATORY (INHALATION)
Status: CANCELLED
Start: 2024-07-17

## 2024-07-15 ENCOUNTER — APPOINTMENT (OUTPATIENT)
Dept: LAB | Facility: CLINIC | Age: 79
End: 2024-07-15
Attending: INTERNAL MEDICINE
Payer: MEDICARE

## 2024-07-15 ENCOUNTER — INFUSION THERAPY VISIT (OUTPATIENT)
Dept: ONCOLOGY | Facility: CLINIC | Age: 79
End: 2024-07-15
Attending: INTERNAL MEDICINE
Payer: MEDICARE

## 2024-07-15 VITALS
HEART RATE: 74 BPM | RESPIRATION RATE: 16 BRPM | OXYGEN SATURATION: 100 % | BODY MASS INDEX: 23.98 KG/M2 | SYSTOLIC BLOOD PRESSURE: 124 MMHG | TEMPERATURE: 98.3 F | WEIGHT: 161 LBS | DIASTOLIC BLOOD PRESSURE: 80 MMHG

## 2024-07-15 DIAGNOSIS — C17.1 MALIGNANT NEOPLASM OF JEJUNUM (H): Primary | ICD-10-CM

## 2024-07-15 LAB
ALBUMIN SERPL BCG-MCNC: 4 G/DL (ref 3.5–5.2)
ALP SERPL-CCNC: 59 U/L (ref 40–150)
ALT SERPL W P-5'-P-CCNC: 17 U/L (ref 0–70)
ANION GAP SERPL CALCULATED.3IONS-SCNC: 9 MMOL/L (ref 7–15)
AST SERPL W P-5'-P-CCNC: 24 U/L (ref 0–45)
BILIRUB SERPL-MCNC: 0.3 MG/DL
BUN SERPL-MCNC: 23 MG/DL (ref 8–23)
CALCIUM SERPL-MCNC: 8.7 MG/DL (ref 8.8–10.2)
CHLORIDE SERPL-SCNC: 103 MMOL/L (ref 98–107)
CREAT SERPL-MCNC: 1.01 MG/DL (ref 0.67–1.17)
EGFRCR SERPLBLD CKD-EPI 2021: 76 ML/MIN/1.73M2
GLUCOSE SERPL-MCNC: 96 MG/DL (ref 70–99)
HCO3 SERPL-SCNC: 25 MMOL/L (ref 22–29)
POTASSIUM SERPL-SCNC: 4.3 MMOL/L (ref 3.4–5.3)
PROT SERPL-MCNC: 6.2 G/DL (ref 6.4–8.3)
SODIUM SERPL-SCNC: 137 MMOL/L (ref 135–145)
T4 FREE SERPL-MCNC: 1.08 NG/DL (ref 0.9–1.7)
TSH SERPL DL<=0.005 MIU/L-ACNC: 0.15 UIU/ML (ref 0.3–4.2)

## 2024-07-15 PROCEDURE — 80053 COMPREHEN METABOLIC PANEL: CPT | Performed by: INTERNAL MEDICINE

## 2024-07-15 PROCEDURE — 96413 CHEMO IV INFUSION 1 HR: CPT

## 2024-07-15 PROCEDURE — 250N000011 HC RX IP 250 OP 636: Performed by: INTERNAL MEDICINE

## 2024-07-15 PROCEDURE — 258N000003 HC RX IP 258 OP 636: Performed by: INTERNAL MEDICINE

## 2024-07-15 PROCEDURE — 36591 DRAW BLOOD OFF VENOUS DEVICE: CPT | Performed by: INTERNAL MEDICINE

## 2024-07-15 PROCEDURE — 84439 ASSAY OF FREE THYROXINE: CPT | Performed by: INTERNAL MEDICINE

## 2024-07-15 PROCEDURE — 84443 ASSAY THYROID STIM HORMONE: CPT | Performed by: INTERNAL MEDICINE

## 2024-07-15 RX ORDER — HEPARIN SODIUM (PORCINE) LOCK FLUSH IV SOLN 100 UNIT/ML 100 UNIT/ML
5 SOLUTION INTRAVENOUS
Status: COMPLETED | OUTPATIENT
Start: 2024-07-15 | End: 2024-07-15

## 2024-07-15 RX ORDER — HEPARIN SODIUM (PORCINE) LOCK FLUSH IV SOLN 100 UNIT/ML 100 UNIT/ML
5 SOLUTION INTRAVENOUS
Status: DISCONTINUED | OUTPATIENT
Start: 2024-07-15 | End: 2024-07-15 | Stop reason: HOSPADM

## 2024-07-15 RX ADMIN — Medication 5 ML: at 16:52

## 2024-07-15 RX ADMIN — SODIUM CHLORIDE 480 MG: 9 INJECTION, SOLUTION INTRAVENOUS at 16:16

## 2024-07-15 RX ADMIN — Medication 5 ML: at 15:11

## 2024-07-15 RX ADMIN — SODIUM CHLORIDE 250 ML: 9 INJECTION, SOLUTION INTRAVENOUS at 16:08

## 2024-07-15 ASSESSMENT — PAIN SCALES - GENERAL: PAINLEVEL: NO PAIN (0)

## 2024-07-15 NOTE — NURSING NOTE
"Chief Complaint   Patient presents with    Port Draw     Labs drawn from port by rn.  VS taken.     Port accessed with 20 gauge 3/4\" Power needle and labs drawn by rn.  Port flushed with NS and heparin.  Pt tolerated well.  VS taken.  Pt checked in for next appt.    Brenna Oliver RN      "

## 2024-07-15 NOTE — PATIENT INSTRUCTIONS
[facility] Triage and after hours / weekends / holidays:  [phone]    Please call the triage or after hours line if you experience a temperature greater than or equal to 100.4, shaking chills, have uncontrolled nausea, vomiting and/or diarrhea, dizziness, shortness of breath, chest pain, bleeding, unexplained bruising, or if you have any other new/concerning symptoms, questions or concerns.      If you are having any concerning symptoms or wish to speak to a provider before your next infusion visit, please call triage to notify them so we can adequately serve you.     If you need a refill on a narcotic prescription or other medication, please call before your infusion appointment.

## 2024-07-15 NOTE — PROGRESS NOTES
Infusion Nursing Note:  Demetrius Espinoza presents today for Cycle 3 Day 1 Nivolumab.    Patient seen by provider today: No   present during visit today: Not Applicable.    Note: Patient denies any signs or symptoms of infection. Patient reports intermittent constipation managed at home with Metamucil as needed. Patient also notes a mild skin irritation on his left forearm that is improving. Patient offers no other complaints or concerns. Patient agreeable to treatment plan today.    Intravenous Access:  Implanted Port.    Treatment Conditions:  Lab Results   Component Value Date     07/15/2024    POTASSIUM 4.3 07/15/2024    CR 1.01 07/15/2024    YAMILEX 8.7 (L) 07/15/2024    BILITOTAL 0.3 07/15/2024    ALBUMIN 4.0 07/15/2024    ALT 17 07/15/2024    AST 24 07/15/2024     Results reviewed, labs MET treatment parameters, ok to proceed with treatment.    Post Infusion Assessment:  Patient tolerated infusion without incident.  Blood return noted pre and post infusion.  Site patent and intact, free from redness, edema or discomfort.  No evidence of extravasations.  Access discontinued per protocol.     Discharge Plan:   Patient declined prescription refills.  Discharge instructions reviewed with: Patient.  Patient and/or family verbalized understanding of discharge instructions and all questions answered.  AVS to patient via [employer].  Patient will return 08/09/24 for a CT scan and 08/12/24 for next infusion appointment.   Patient discharged in stable condition accompanied by: self.  Departure Mode: Ambulatory.      Tiffany Blunt RN

## 2024-08-05 ENCOUNTER — PRE VISIT (OUTPATIENT)
Dept: SURGERY | Facility: CLINIC | Age: 79
End: 2024-08-05

## 2024-08-08 DIAGNOSIS — C17.1 MALIGNANT NEOPLASM OF JEJUNUM (H): Primary | ICD-10-CM

## 2024-08-08 RX ORDER — DIPHENHYDRAMINE HYDROCHLORIDE 50 MG/ML
50 INJECTION INTRAMUSCULAR; INTRAVENOUS
Status: CANCELLED
Start: 2024-08-14

## 2024-08-08 RX ORDER — EPINEPHRINE 1 MG/ML
0.3 INJECTION, SOLUTION INTRAMUSCULAR; SUBCUTANEOUS EVERY 5 MIN PRN
Status: CANCELLED | OUTPATIENT
Start: 2024-08-14

## 2024-08-08 RX ORDER — ALBUTEROL SULFATE 90 UG/1
1-2 AEROSOL, METERED RESPIRATORY (INHALATION)
Status: CANCELLED
Start: 2024-08-14

## 2024-08-08 RX ORDER — METHYLPREDNISOLONE SODIUM SUCCINATE 125 MG/2ML
125 INJECTION, POWDER, LYOPHILIZED, FOR SOLUTION INTRAMUSCULAR; INTRAVENOUS
Status: CANCELLED
Start: 2024-08-14

## 2024-08-08 RX ORDER — LORAZEPAM 2 MG/ML
0.5 INJECTION INTRAMUSCULAR EVERY 4 HOURS PRN
Status: CANCELLED | OUTPATIENT
Start: 2024-08-14

## 2024-08-08 RX ORDER — ALBUTEROL SULFATE 0.83 MG/ML
2.5 SOLUTION RESPIRATORY (INHALATION)
Status: CANCELLED | OUTPATIENT
Start: 2024-08-14

## 2024-08-08 RX ORDER — HEPARIN SODIUM (PORCINE) LOCK FLUSH IV SOLN 100 UNIT/ML 100 UNIT/ML
5 SOLUTION INTRAVENOUS
Status: CANCELLED | OUTPATIENT
Start: 2024-08-14

## 2024-08-08 RX ORDER — MEPERIDINE HYDROCHLORIDE 25 MG/ML
25 INJECTION INTRAMUSCULAR; INTRAVENOUS; SUBCUTANEOUS EVERY 30 MIN PRN
Status: CANCELLED | OUTPATIENT
Start: 2024-08-14

## 2024-08-08 RX ORDER — HEPARIN SODIUM,PORCINE 10 UNIT/ML
5-20 VIAL (ML) INTRAVENOUS DAILY PRN
Status: CANCELLED | OUTPATIENT
Start: 2024-08-14

## 2024-08-12 ENCOUNTER — APPOINTMENT (OUTPATIENT)
Dept: LAB | Facility: CLINIC | Age: 79
End: 2024-08-12
Attending: INTERNAL MEDICINE
Payer: MEDICARE

## 2024-08-12 ENCOUNTER — INFUSION THERAPY VISIT (OUTPATIENT)
Dept: ONCOLOGY | Facility: CLINIC | Age: 79
End: 2024-08-12
Attending: INTERNAL MEDICINE
Payer: MEDICARE

## 2024-08-12 VITALS
RESPIRATION RATE: 16 BRPM | HEART RATE: 74 BPM | DIASTOLIC BLOOD PRESSURE: 79 MMHG | TEMPERATURE: 98.1 F | OXYGEN SATURATION: 99 % | WEIGHT: 161.2 LBS | BODY MASS INDEX: 24.01 KG/M2 | SYSTOLIC BLOOD PRESSURE: 118 MMHG

## 2024-08-12 DIAGNOSIS — C17.1 MALIGNANT NEOPLASM OF JEJUNUM (H): Primary | ICD-10-CM

## 2024-08-12 LAB
ALBUMIN SERPL BCG-MCNC: 4 G/DL (ref 3.5–5.2)
ALP SERPL-CCNC: 78 U/L (ref 40–150)
ALT SERPL W P-5'-P-CCNC: 17 U/L (ref 0–70)
ANION GAP SERPL CALCULATED.3IONS-SCNC: 10 MMOL/L (ref 7–15)
AST SERPL W P-5'-P-CCNC: 23 U/L (ref 0–45)
BILIRUB SERPL-MCNC: 0.6 MG/DL
BUN SERPL-MCNC: 19 MG/DL (ref 8–23)
CALCIUM SERPL-MCNC: 8.9 MG/DL (ref 8.8–10.4)
CHLORIDE SERPL-SCNC: 101 MMOL/L (ref 98–107)
CREAT SERPL-MCNC: 0.99 MG/DL (ref 0.67–1.17)
EGFRCR SERPLBLD CKD-EPI 2021: 78 ML/MIN/1.73M2
GLUCOSE SERPL-MCNC: 98 MG/DL (ref 70–99)
HCO3 SERPL-SCNC: 25 MMOL/L (ref 22–29)
POTASSIUM SERPL-SCNC: 4.3 MMOL/L (ref 3.4–5.3)
PROT SERPL-MCNC: 6.2 G/DL (ref 6.4–8.3)
SODIUM SERPL-SCNC: 136 MMOL/L (ref 135–145)
TSH SERPL DL<=0.005 MIU/L-ACNC: 0.41 UIU/ML (ref 0.3–4.2)

## 2024-08-12 PROCEDURE — 258N000003 HC RX IP 258 OP 636: Performed by: INTERNAL MEDICINE

## 2024-08-12 PROCEDURE — 80053 COMPREHEN METABOLIC PANEL: CPT | Performed by: INTERNAL MEDICINE

## 2024-08-12 PROCEDURE — 96413 CHEMO IV INFUSION 1 HR: CPT

## 2024-08-12 PROCEDURE — 84443 ASSAY THYROID STIM HORMONE: CPT | Performed by: INTERNAL MEDICINE

## 2024-08-12 PROCEDURE — 36591 DRAW BLOOD OFF VENOUS DEVICE: CPT | Performed by: INTERNAL MEDICINE

## 2024-08-12 PROCEDURE — 250N000011 HC RX IP 250 OP 636: Performed by: INTERNAL MEDICINE

## 2024-08-12 RX ORDER — HEPARIN SODIUM (PORCINE) LOCK FLUSH IV SOLN 100 UNIT/ML 100 UNIT/ML
5 SOLUTION INTRAVENOUS EVERY 8 HOURS
Status: DISCONTINUED | OUTPATIENT
Start: 2024-08-12 | End: 2024-08-12 | Stop reason: HOSPADM

## 2024-08-12 RX ORDER — HEPARIN SODIUM (PORCINE) LOCK FLUSH IV SOLN 100 UNIT/ML 100 UNIT/ML
5 SOLUTION INTRAVENOUS
Status: DISCONTINUED | OUTPATIENT
Start: 2024-08-12 | End: 2024-08-12 | Stop reason: HOSPADM

## 2024-08-12 RX ADMIN — Medication 5 ML: at 16:29

## 2024-08-12 RX ADMIN — SODIUM CHLORIDE 250 ML: 9 INJECTION, SOLUTION INTRAVENOUS at 15:49

## 2024-08-12 RX ADMIN — Medication 5 ML: at 14:48

## 2024-08-12 RX ADMIN — SODIUM CHLORIDE 480 MG: 9 INJECTION, SOLUTION INTRAVENOUS at 15:53

## 2024-08-12 ASSESSMENT — PAIN SCALES - GENERAL: PAINLEVEL: NO PAIN (0)

## 2024-08-12 NOTE — PATIENT INSTRUCTIONS
Contact Numbers  [facility]: [phone] (for symptom and scheduling needs)    Please call the [facility] Triage line if you experience a temperature greater than or equal to 100.4, shaking chills, have uncontrolled nausea, vomiting and/or diarrhea, dizziness, shortness of breath, chest pain, bleeding, unexplained bruising, or if you have any other new/concerning symptoms, questions or concerns.     If you are having any concerning symptoms or wish to speak to a provider before your next infusion visit, please call your care coordinator or triage to notify them so we can adequately serve you.     If you need a refill on a narcotic prescription or other medication, please call triage before your infusion appointment.           August 2024 Sunday Monday Tuesday Wednesday Thursday Friday Saturday                       1     2     3       4     5     6     7     8     9     10       11     12    LAB PERIPHERAL   2:30 PM   (15 min.)   [facility] LAB DRAW   [facility]    ONC INFUSION 1 HR (60 MIN)   3:00 PM   (60 min.)    ONC INFUSION NURSE   [facility] 13     14     15     16     17       18     19     20    LAB PERIPHERAL  10:30 AM   (15 min.)   [facility] LAB DRAW   [facility]    CT CHEST/ABDOMEN/PELVIS W  10:50 AM   (20 min.)   [facility]   [facility] 21     22     23     24       25     26    RETURN CCSL   5:15 PM   (30 min.)   Hima Fregoso MD   [facility] 27 28 29 30 31 September 2024 Sunday Monday Tuesday Wednesday Thursday Friday Saturday   1     2     3     4     5     6     7       8     9    LAB PERIPHERAL   1:30 PM   (15 min.)   [facility] LAB DRAW   [facility]    ONC INFUSION 1 HR (60 MIN)   2:00 PM   (60 min.)    ONC INFUSION NURSE   [facility]  Clinic 10     11     12     13     14       15     16     17     18     19     20     21       22     23     24     25     26     27     28       29     30                                              Lab Results:  Recent Results (from the past 12 hour(s))   Comprehensive metabolic panel    Collection Time: 08/12/24  2:47 PM   Result Value Ref Range    Sodium 136 135 - 145 mmol/L    Potassium 4.3 3.4 - 5.3 mmol/L    Carbon Dioxide (CO2) 25 22 - 29 mmol/L    Anion Gap 10 7 - 15 mmol/L    Urea Nitrogen 19.0 8.0 - 23.0 mg/dL    Creatinine 0.99 0.67 - 1.17 mg/dL    GFR Estimate 78 >60 mL/min/1.73m2    Calcium 8.9 8.8 - 10.4 mg/dL    Chloride 101 98 - 107 mmol/L    Glucose 98 70 - 99 mg/dL    Alkaline Phosphatase 78 40 - 150 U/L    AST 23 0 - 45 U/L    ALT 17 0 - 70 U/L    Protein Total 6.2 (L) 6.4 - 8.3 g/dL    Albumin 4.0 3.5 - 5.2 g/dL    Bilirubin Total 0.6 <=1.2 mg/dL   TSH with free T4 reflex    Collection Time: 08/12/24  2:47 PM   Result Value Ref Range    TSH 0.41 0.30 - 4.20 uIU/mL

## 2024-08-12 NOTE — PROGRESS NOTES
Infusion Nursing Note:  Demetrius Espinoza presents today for Cycle 4 Day 1 Nivolumab.    Patient seen by provider today: No   present during visit today: Not Applicable.    Note: Patient presents to infusion today doing well. No recent fevers, chills, SOB, chest pains or cough. Intermittent constipation that he manages with metamucil. Wishes to proceed with planned treatment today.    Intravenous Access:  Implanted Port.    Treatment Conditions:  Lab Results   Component Value Date     08/12/2024    POTASSIUM 4.3 08/12/2024    CR 0.99 08/12/2024    YAMILEX 8.9 08/12/2024    BILITOTAL 0.6 08/12/2024    ALBUMIN 4.0 08/12/2024    ALT 17 08/12/2024    AST 23 08/12/2024       Results reviewed, labs MET treatment parameters, ok to proceed with treatment.    Post Infusion Assessment:  Patient tolerated infusion without incident.  Blood return noted pre and post infusion.  Site patent and intact, free from redness, edema or discomfort.  No evidence of extravasations.  Access discontinued per protocol.     Discharge Plan:   Patient declined prescription refills.  Discharge instructions reviewed with: Patient.  Patient and/or family verbalized understanding of discharge instructions and all questions answered.  AVS to patient via [employer].  Patient will return 8/20 for CT scan and 8/26 for next appointment with Dr. Fregoso.   Patient discharged in stable condition accompanied by: self.  Departure Mode: Ambulatory.      Karly Bruce RN

## 2024-08-12 NOTE — NURSING NOTE
Chief Complaint   Patient presents with    Port Draw     Power needle. Heparin locked,v itals checked     Natalie Silva RN on 8/12/2024 at 2:49 PM

## 2024-08-20 ENCOUNTER — ANCILLARY PROCEDURE (OUTPATIENT)
Dept: CT IMAGING | Facility: CLINIC | Age: 79
End: 2024-08-20
Attending: INTERNAL MEDICINE
Payer: MEDICARE

## 2024-08-20 ENCOUNTER — LAB (OUTPATIENT)
Dept: LAB | Facility: CLINIC | Age: 79
End: 2024-08-20
Attending: INTERNAL MEDICINE
Payer: MEDICARE

## 2024-08-20 DIAGNOSIS — E03.9 ACQUIRED HYPOTHYROIDISM: ICD-10-CM

## 2024-08-20 DIAGNOSIS — C17.1 MALIGNANT NEOPLASM OF JEJUNUM (H): ICD-10-CM

## 2024-08-20 LAB
ALBUMIN SERPL BCG-MCNC: 3.9 G/DL (ref 3.5–5.2)
ALP SERPL-CCNC: 77 U/L (ref 40–150)
ALT SERPL W P-5'-P-CCNC: 19 U/L (ref 0–70)
ANION GAP SERPL CALCULATED.3IONS-SCNC: 8 MMOL/L (ref 7–15)
AST SERPL W P-5'-P-CCNC: 24 U/L (ref 0–45)
BILIRUB SERPL-MCNC: 0.5 MG/DL
BUN SERPL-MCNC: 15.7 MG/DL (ref 8–23)
CALCIUM SERPL-MCNC: 8.7 MG/DL (ref 8.8–10.4)
CHLORIDE SERPL-SCNC: 104 MMOL/L (ref 98–107)
CREAT SERPL-MCNC: 1 MG/DL (ref 0.67–1.17)
EGFRCR SERPLBLD CKD-EPI 2021: 77 ML/MIN/1.73M2
GLUCOSE SERPL-MCNC: 84 MG/DL (ref 70–99)
HCO3 SERPL-SCNC: 27 MMOL/L (ref 22–29)
POTASSIUM SERPL-SCNC: 3.7 MMOL/L (ref 3.4–5.3)
PROT SERPL-MCNC: 6 G/DL (ref 6.4–8.3)
SODIUM SERPL-SCNC: 139 MMOL/L (ref 135–145)

## 2024-08-20 PROCEDURE — 71260 CT THORAX DX C+: CPT | Mod: MG | Performed by: RADIOLOGY

## 2024-08-20 PROCEDURE — 36591 DRAW BLOOD OFF VENOUS DEVICE: CPT

## 2024-08-20 PROCEDURE — 74177 CT ABD & PELVIS W/CONTRAST: CPT | Mod: MG | Performed by: RADIOLOGY

## 2024-08-20 PROCEDURE — G1010 CDSM STANSON: HCPCS | Performed by: RADIOLOGY

## 2024-08-20 PROCEDURE — 80053 COMPREHEN METABOLIC PANEL: CPT

## 2024-08-20 RX ORDER — HEPARIN SODIUM (PORCINE) LOCK FLUSH IV SOLN 100 UNIT/ML 100 UNIT/ML
500 SOLUTION INTRAVENOUS ONCE
Status: COMPLETED | OUTPATIENT
Start: 2024-08-20 | End: 2024-08-20

## 2024-08-20 RX ORDER — IOPAMIDOL 755 MG/ML
84 INJECTION, SOLUTION INTRAVASCULAR ONCE
Status: COMPLETED | OUTPATIENT
Start: 2024-08-20 | End: 2024-08-20

## 2024-08-20 RX ADMIN — IOPAMIDOL 84 ML: 755 INJECTION, SOLUTION INTRAVASCULAR at 10:45

## 2024-08-20 RX ADMIN — HEPARIN SODIUM (PORCINE) LOCK FLUSH IV SOLN 100 UNIT/ML 500 UNITS: 100 SOLUTION at 12:58

## 2024-08-20 NOTE — DISCHARGE INSTRUCTIONS

## 2024-08-20 NOTE — NURSING NOTE
Chief Complaint   Patient presents with    Blood Draw     Port blood draw by lab RN       Port accessed with blood draw and heparin flush by lab RN.    Abby Subramanian RN

## 2024-08-26 ENCOUNTER — ONCOLOGY VISIT (OUTPATIENT)
Dept: ONCOLOGY | Facility: CLINIC | Age: 79
End: 2024-08-26
Attending: INTERNAL MEDICINE
Payer: MEDICARE

## 2024-08-26 VITALS
DIASTOLIC BLOOD PRESSURE: 82 MMHG | TEMPERATURE: 98.5 F | BODY MASS INDEX: 23.61 KG/M2 | HEART RATE: 82 BPM | OXYGEN SATURATION: 96 % | WEIGHT: 158.5 LBS | SYSTOLIC BLOOD PRESSURE: 136 MMHG | RESPIRATION RATE: 16 BRPM

## 2024-08-26 DIAGNOSIS — E03.9 ACQUIRED HYPOTHYROIDISM: ICD-10-CM

## 2024-08-26 DIAGNOSIS — C17.1 MALIGNANT NEOPLASM OF JEJUNUM (H): Primary | ICD-10-CM

## 2024-08-26 PROCEDURE — 99214 OFFICE O/P EST MOD 30 MIN: CPT | Performed by: INTERNAL MEDICINE

## 2024-08-26 PROCEDURE — G0463 HOSPITAL OUTPT CLINIC VISIT: HCPCS | Performed by: INTERNAL MEDICINE

## 2024-08-26 ASSESSMENT — PAIN SCALES - GENERAL: PAINLEVEL: NO PAIN (0)

## 2024-08-26 NOTE — LETTER
8/26/2024      Demetrius Espinoza  [address] [address]      Dear Colleague,    Thank you for referring your patient, Demetrius Espinoza, to the [facility]. Please see a copy of my visit note below.      I am seeing Demetrius Espinoza back today in follow-up of metastatic small bowel carcinoma.    He is a 79-year-old gentleman diagnosed in the summer 2023 when he presented with iron deficiency and a small bowel obstruction.  He had his primary tumor resected but had a mesenteric mass, presumably lymph nodes, that were deemed too proximal in the mesentery to resect.  He received 3 cycles of FOLFOX and Avastin and then after finding the presence of microsatellite instability he was switched to ipilimumab/nivolumab, since de-escalated to nivolumab alone, and has had an excellent response to that ever since.  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  He has also had some difficulty with dry mouth but not enough to warrant any specific intervention.  He has also had an upper extremity DVT for which he is on Eliquis.  He returns today for his next response assessment.    Overall he tells me he is doing well and is getting settled into his new home.  Continues to have some problems with intermittent muscle cramps in his legs.  He is having some minor problems with intermittent constipation.  He is eating reasonably well and his weight is stable.  I could elicit no other specific symptoms.    On exam he is alert and well-appearing.  He has no icterus.  He has no adenopathy.  His note abdominal masses.  He has no peripheral edema  His speech is fluent screeners are grossly intact    I personally reviewed his CT scan and over the images directly with him.  He has a peritoneal nodule on the right abdominal wall about a centimeter and a half which is stable from his last scan (though not commented on on that scan) in May.  This was  present but smaller on the PET/CT done in January.  He did have 1 and half centimeter mesenteric nodule in the left abdomen on his scan last year, but I am fairly sure that was different from his current nodule.  Otherwise there are some small lung nodules that are stable and of uncertain significance.  I do not see anything else to suggest other sites of metastatic disease.    He has normal electrolytes and renal function.  His bilirubin, albumin and liver enzymes are normal.  I do not have recent blood counts.    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  Currently is tolerating treatment well without any new immune related adverse events with adequate replacement of his steroid and thyroid deficiencies.  Peritoneal nodule which seems to have grown since January actually is no different of the last 3 months and for now it is not a large enough issue to warrant any change in his therapy.  I recommended the patient we continue on with his current therapy and he was agreeable to that.  He did raise a question whether there was really value to continue treatment beyond 1 year.  We discussed that 2 years of treatment would be standard but there are not well-defined studies as to the optimal length of treatment and whether more or less than 2 years is better is unclear.  We will plan on seeing him back in another 3 months for another response assessment.  He will let us know if there are any change in his symptoms in the interim.      Again, thank you for allowing me to participate in the care of your patient.        Sincerely,        Hima Fregoso MD

## 2024-08-26 NOTE — LETTER
8/26/2024         RE: Demetrius Espinoza  [address] [address]        I am seeing Demetrius Espinoza back today in follow-up of metastatic small bowel carcinoma.    He is a 79-year-old gentleman diagnosed in the summer 2023 when he presented with iron deficiency and a small bowel obstruction.  He had his primary tumor resected but had a mesenteric mass, presumably lymph nodes, that were deemed too proximal in the mesentery to resect.  He received 3 cycles of FOLFOX and Avastin and then after finding the presence of microsatellite instability he was switched to ipilimumab/nivolumab, since de-escalated to nivolumab alone, and has had an excellent response to that ever since.  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  He has also had some difficulty with dry mouth but not enough to warrant any specific intervention.  He has also had an upper extremity DVT for which he is on Eliquis.  He returns today for his next response assessment.    Overall he tells me he is doing well and is getting settled into his new home.  Continues to have some problems with intermittent muscle cramps in his legs.  He is having some minor problems with intermittent constipation.  He is eating reasonably well and his weight is stable.  I could elicit no other specific symptoms.    On exam he is alert and well-appearing.  He has no icterus.  He has no adenopathy.  His note abdominal masses.  He has no peripheral edema  His speech is fluent screeners are grossly intact    I personally reviewed his CT scan and over the images directly with him.  He has a peritoneal nodule on the right abdominal wall about a centimeter and a half which is stable from his last scan (though not commented on on that scan) in May.  This was present but smaller on the PET/CT done in January.  He did have 1 and half centimeter mesenteric nodule in the left abdomen on his scan last year, but I  am fairly sure that was different from his current nodule.  Otherwise there are some small lung nodules that are stable and of uncertain significance.  I do not see anything else to suggest other sites of metastatic disease.    He has normal electrolytes and renal function.  His bilirubin, albumin and liver enzymes are normal.  I do not have recent blood counts.    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  Currently is tolerating treatment well without any new immune related adverse events with adequate replacement of his steroid and thyroid deficiencies.  Peritoneal nodule which seems to have grown since January actually is no different of the last 3 months and for now it is not a large enough issue to warrant any change in his therapy.  I recommended the patient we continue on with his current therapy and he was agreeable to that.  He did raise a question whether there was really value to continue treatment beyond 1 year.  We discussed that 2 years of treatment would be standard but there are not well-defined studies as to the optimal length of treatment and whether more or less than 2 years is better is unclear.  We will plan on seeing him back in another 3 months for another response assessment.  He will let us know if there are any change in his symptoms in the interim.        Hima Fregoso MD

## 2024-08-26 NOTE — NURSING NOTE
"Oncology Rooming Note    August 26, 2024 5:22 PM   Demetrius Espinoza is a 79 year old male who presents for:    Chief Complaint   Patient presents with    Oncology Clinic Visit     Malignant neoplasm of jejunum        Initial Vitals: /82 (BP Location: Left arm, Patient Position: Sitting, Cuff Size: Adult Regular)   Pulse 82   Temp 98.5  F (36.9  C) (Oral)   Resp 16   Wt 71.9 kg (158 lb 8 oz)   SpO2 96%   BMI 23.61 kg/m   Estimated body mass index is 23.61 kg/m  as calculated from the following:    Height as of 5/13/24: 1.745 m (5' 8.7\").    Weight as of this encounter: 71.9 kg (158 lb 8 oz). Body surface area is 1.87 meters squared.  No Pain (0) Comment: Data Unavailable   No LMP for male patient.  Allergies reviewed: Yes  Medications reviewed: Yes    Medications: Medication refills not needed today.  Pharmacy name entered into [employer]: [id number] [place], [place] - [address]    Frailty Screening:   Is the patient here for a new oncology consult visit in cancer care? 2. No    Clinical concerns: none       Monica Vela"

## 2024-08-26 NOTE — PROGRESS NOTES
I am seeing Demetrius Espinoza back today in follow-up of metastatic small bowel carcinoma.    He is a 79-year-old gentleman diagnosed in the summer 2023 when he presented with iron deficiency and a small bowel obstruction.  He had his primary tumor resected but had a mesenteric mass, presumably lymph nodes, that were deemed too proximal in the mesentery to resect.  He received 3 cycles of FOLFOX and Avastin and then after finding the presence of microsatellite instability he was switched to ipilimumab/nivolumab, since de-escalated to nivolumab alone, and has had an excellent response to that ever since.  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  He has also had some difficulty with dry mouth but not enough to warrant any specific intervention.  He has also had an upper extremity DVT for which he is on Eliquis.  He returns today for his next response assessment.    Overall he tells me he is doing well and is getting settled into his new home.  Continues to have some problems with intermittent muscle cramps in his legs.  He is having some minor problems with intermittent constipation.  He is eating reasonably well and his weight is stable.  I could elicit no other specific symptoms.    On exam he is alert and well-appearing.  He has no icterus.  He has no adenopathy.  His note abdominal masses.  He has no peripheral edema  His speech is fluent screeners are grossly intact    I personally reviewed his CT scan and over the images directly with him.  He has a peritoneal nodule on the right abdominal wall about a centimeter and a half which is stable from his last scan (though not commented on on that scan) in May.  This was present but smaller on the PET/CT done in January.  He did have 1 and half centimeter mesenteric nodule in the left abdomen on his scan last year, but I am fairly sure that was different from his current nodule.  Otherwise there are some  small lung nodules that are stable and of uncertain significance.  I do not see anything else to suggest other sites of metastatic disease.    He has normal electrolytes and renal function.  His bilirubin, albumin and liver enzymes are normal.  I do not have recent blood counts.    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  Currently is tolerating treatment well without any new immune related adverse events with adequate replacement of his steroid and thyroid deficiencies.  Peritoneal nodule which seems to have grown since January actually is no different of the last 3 months and for now it is not a large enough issue to warrant any change in his therapy.  I recommended the patient we continue on with his current therapy and he was agreeable to that.  He did raise a question whether there was really value to continue treatment beyond 1 year.  We discussed that 2 years of treatment would be standard but there are not well-defined studies as to the optimal length of treatment and whether more or less than 2 years is better is unclear.  We will plan on seeing him back in another 3 months for another response assessment.  He will let us know if there are any change in his symptoms in the interim.

## 2024-09-08 RX ORDER — ALBUTEROL SULFATE 0.83 MG/ML
2.5 SOLUTION RESPIRATORY (INHALATION)
Status: CANCELLED | OUTPATIENT
Start: 2024-09-09

## 2024-09-08 RX ORDER — DIPHENHYDRAMINE HYDROCHLORIDE 50 MG/ML
50 INJECTION INTRAMUSCULAR; INTRAVENOUS
Status: CANCELLED
Start: 2024-09-09

## 2024-09-08 RX ORDER — HEPARIN SODIUM,PORCINE 10 UNIT/ML
5-20 VIAL (ML) INTRAVENOUS DAILY PRN
Status: CANCELLED | OUTPATIENT
Start: 2024-09-09

## 2024-09-08 RX ORDER — MEPERIDINE HYDROCHLORIDE 25 MG/ML
25 INJECTION INTRAMUSCULAR; INTRAVENOUS; SUBCUTANEOUS EVERY 30 MIN PRN
Status: CANCELLED | OUTPATIENT
Start: 2024-09-09

## 2024-09-08 RX ORDER — EPINEPHRINE 1 MG/ML
0.3 INJECTION, SOLUTION INTRAMUSCULAR; SUBCUTANEOUS EVERY 5 MIN PRN
Status: CANCELLED | OUTPATIENT
Start: 2024-09-09

## 2024-09-08 RX ORDER — LORAZEPAM 2 MG/ML
0.5 INJECTION INTRAMUSCULAR EVERY 4 HOURS PRN
Status: CANCELLED | OUTPATIENT
Start: 2024-09-09

## 2024-09-08 RX ORDER — HEPARIN SODIUM (PORCINE) LOCK FLUSH IV SOLN 100 UNIT/ML 100 UNIT/ML
5 SOLUTION INTRAVENOUS
Status: CANCELLED | OUTPATIENT
Start: 2024-09-09

## 2024-09-08 RX ORDER — METHYLPREDNISOLONE SODIUM SUCCINATE 125 MG/2ML
125 INJECTION, POWDER, LYOPHILIZED, FOR SOLUTION INTRAMUSCULAR; INTRAVENOUS
Status: CANCELLED
Start: 2024-09-09

## 2024-09-08 RX ORDER — ALBUTEROL SULFATE 90 UG/1
1-2 AEROSOL, METERED RESPIRATORY (INHALATION)
Status: CANCELLED
Start: 2024-09-09

## 2024-09-09 ENCOUNTER — INFUSION THERAPY VISIT (OUTPATIENT)
Dept: ONCOLOGY | Facility: CLINIC | Age: 79
End: 2024-09-09
Attending: INTERNAL MEDICINE
Payer: MEDICARE

## 2024-09-09 ENCOUNTER — APPOINTMENT (OUTPATIENT)
Dept: LAB | Facility: CLINIC | Age: 79
End: 2024-09-09
Attending: INTERNAL MEDICINE
Payer: MEDICARE

## 2024-09-09 VITALS
BODY MASS INDEX: 24.15 KG/M2 | TEMPERATURE: 98.1 F | WEIGHT: 162.1 LBS | RESPIRATION RATE: 16 BRPM | OXYGEN SATURATION: 99 % | DIASTOLIC BLOOD PRESSURE: 83 MMHG | HEART RATE: 72 BPM | SYSTOLIC BLOOD PRESSURE: 130 MMHG

## 2024-09-09 DIAGNOSIS — C17.1 MALIGNANT NEOPLASM OF JEJUNUM (H): Primary | ICD-10-CM

## 2024-09-09 LAB
ALBUMIN SERPL BCG-MCNC: 3.9 G/DL (ref 3.5–5.2)
ALP SERPL-CCNC: 66 U/L (ref 40–150)
ALT SERPL W P-5'-P-CCNC: 20 U/L (ref 0–70)
ANION GAP SERPL CALCULATED.3IONS-SCNC: 8 MMOL/L (ref 7–15)
AST SERPL W P-5'-P-CCNC: 24 U/L (ref 0–45)
BILIRUB SERPL-MCNC: 0.5 MG/DL
BUN SERPL-MCNC: 18.7 MG/DL (ref 8–23)
CALCIUM SERPL-MCNC: 8.8 MG/DL (ref 8.8–10.4)
CHLORIDE SERPL-SCNC: 105 MMOL/L (ref 98–107)
CREAT SERPL-MCNC: 0.97 MG/DL (ref 0.67–1.17)
EGFRCR SERPLBLD CKD-EPI 2021: 80 ML/MIN/1.73M2
GLUCOSE SERPL-MCNC: 96 MG/DL (ref 70–99)
HCO3 SERPL-SCNC: 27 MMOL/L (ref 22–29)
POTASSIUM SERPL-SCNC: 4.4 MMOL/L (ref 3.4–5.3)
PROT SERPL-MCNC: 6.1 G/DL (ref 6.4–8.3)
SODIUM SERPL-SCNC: 140 MMOL/L (ref 135–145)
TSH SERPL DL<=0.005 MIU/L-ACNC: 0.44 UIU/ML (ref 0.3–4.2)

## 2024-09-09 PROCEDURE — 96413 CHEMO IV INFUSION 1 HR: CPT

## 2024-09-09 PROCEDURE — 82040 ASSAY OF SERUM ALBUMIN: CPT | Performed by: INTERNAL MEDICINE

## 2024-09-09 PROCEDURE — 36591 DRAW BLOOD OFF VENOUS DEVICE: CPT | Performed by: INTERNAL MEDICINE

## 2024-09-09 PROCEDURE — 250N000011 HC RX IP 250 OP 636: Performed by: INTERNAL MEDICINE

## 2024-09-09 PROCEDURE — 84155 ASSAY OF PROTEIN SERUM: CPT | Performed by: INTERNAL MEDICINE

## 2024-09-09 PROCEDURE — 258N000003 HC RX IP 258 OP 636: Performed by: INTERNAL MEDICINE

## 2024-09-09 PROCEDURE — 84443 ASSAY THYROID STIM HORMONE: CPT | Performed by: INTERNAL MEDICINE

## 2024-09-09 RX ORDER — HEPARIN SODIUM (PORCINE) LOCK FLUSH IV SOLN 100 UNIT/ML 100 UNIT/ML
5 SOLUTION INTRAVENOUS
Status: DISCONTINUED | OUTPATIENT
Start: 2024-09-09 | End: 2024-09-09 | Stop reason: HOSPADM

## 2024-09-09 RX ORDER — HEPARIN SODIUM (PORCINE) LOCK FLUSH IV SOLN 100 UNIT/ML 100 UNIT/ML
5 SOLUTION INTRAVENOUS ONCE
Status: COMPLETED | OUTPATIENT
Start: 2024-09-09 | End: 2024-09-09

## 2024-09-09 RX ADMIN — Medication 5 ML: at 15:31

## 2024-09-09 RX ADMIN — SODIUM CHLORIDE 250 ML: 9 INJECTION, SOLUTION INTRAVENOUS at 14:53

## 2024-09-09 RX ADMIN — SODIUM CHLORIDE 480 MG: 9 INJECTION, SOLUTION INTRAVENOUS at 14:53

## 2024-09-09 RX ADMIN — Medication 5 ML: at 13:34

## 2024-09-09 ASSESSMENT — PAIN SCALES - GENERAL: PAINLEVEL: NO PAIN (0)

## 2024-09-09 NOTE — PROGRESS NOTES
Infusion Nursing Note:  Demetrius Espinoza presents today for C5D1 Nivolumab.    Patient seen by provider today: No   present during visit today: Not Applicable.    Note: Demetrius presents to infusion today feeling well. He denies any pain, infectious symptoms, or other concerns.    Intravenous Access:  Implanted Port.    Treatment Conditions:  Lab Results   Component Value Date     09/09/2024    POTASSIUM 4.4 09/09/2024    CR 0.97 09/09/2024    YAMILEX 8.8 09/09/2024    BILITOTAL 0.5 09/09/2024    ALBUMIN 3.9 09/09/2024    ALT 20 09/09/2024    AST 24 09/09/2024     Results reviewed, labs MET treatment parameters, ok to proceed with treatment.    Post Infusion Assessment:  Patient tolerated infusion without incident.  Blood return noted pre and post infusion.  Site patent and intact, free from redness, edema or discomfort.  No evidence of extravasations.  Access discontinued per protocol.     Discharge Plan:   Patient declined prescription refills.  Discharge instructions reviewed with: Patient.  Patient and/or family verbalized understanding of discharge instructions and all questions answered.  AVS to patient via [employer].  Patient will return 10/9 for next appointment.   Patient discharged in stable condition accompanied by: self.  Departure Mode: Ambulatory.      Rebecca Mijares RN

## 2024-09-09 NOTE — NURSING NOTE
Chief Complaint   Patient presents with    Port Draw     Labs drawn via port by RN in lab. VS taken.      Labs drawn via port by RN. Port accessed with 20g flat needle. Flushed with saline and heparin. Pt tolerated well. Vitals taken. Pt checked into next appt.     Umm Restrepo RN

## 2024-10-06 RX ORDER — ALBUTEROL SULFATE 0.83 MG/ML
2.5 SOLUTION RESPIRATORY (INHALATION)
Status: CANCELLED | OUTPATIENT
Start: 2024-10-07

## 2024-10-06 RX ORDER — EPINEPHRINE 1 MG/ML
0.3 INJECTION, SOLUTION INTRAMUSCULAR; SUBCUTANEOUS EVERY 5 MIN PRN
Status: CANCELLED | OUTPATIENT
Start: 2024-10-07

## 2024-10-06 RX ORDER — ALBUTEROL SULFATE 90 UG/1
1-2 INHALANT RESPIRATORY (INHALATION)
Status: CANCELLED
Start: 2024-10-07

## 2024-10-06 RX ORDER — DIPHENHYDRAMINE HYDROCHLORIDE 50 MG/ML
50 INJECTION INTRAMUSCULAR; INTRAVENOUS
Status: CANCELLED
Start: 2024-10-07

## 2024-10-06 RX ORDER — HEPARIN SODIUM,PORCINE 10 UNIT/ML
5-20 VIAL (ML) INTRAVENOUS DAILY PRN
Status: CANCELLED | OUTPATIENT
Start: 2024-10-07

## 2024-10-06 RX ORDER — METHYLPREDNISOLONE SODIUM SUCCINATE 125 MG/2ML
125 INJECTION INTRAMUSCULAR; INTRAVENOUS
Status: CANCELLED
Start: 2024-10-07

## 2024-10-06 RX ORDER — LORAZEPAM 2 MG/ML
0.5 INJECTION INTRAMUSCULAR EVERY 4 HOURS PRN
Status: CANCELLED | OUTPATIENT
Start: 2024-10-07

## 2024-10-06 RX ORDER — HEPARIN SODIUM (PORCINE) LOCK FLUSH IV SOLN 100 UNIT/ML 100 UNIT/ML
5 SOLUTION INTRAVENOUS
Status: CANCELLED | OUTPATIENT
Start: 2024-10-07

## 2024-10-06 RX ORDER — MEPERIDINE HYDROCHLORIDE 25 MG/ML
25 INJECTION INTRAMUSCULAR; INTRAVENOUS; SUBCUTANEOUS EVERY 30 MIN PRN
Status: CANCELLED | OUTPATIENT
Start: 2024-10-07

## 2024-10-09 ENCOUNTER — APPOINTMENT (OUTPATIENT)
Dept: LAB | Facility: CLINIC | Age: 79
End: 2024-10-09
Attending: INTERNAL MEDICINE
Payer: MEDICARE

## 2024-10-09 ENCOUNTER — INFUSION THERAPY VISIT (OUTPATIENT)
Dept: ONCOLOGY | Facility: CLINIC | Age: 79
End: 2024-10-09
Attending: INTERNAL MEDICINE
Payer: MEDICARE

## 2024-10-09 VITALS
SYSTOLIC BLOOD PRESSURE: 116 MMHG | DIASTOLIC BLOOD PRESSURE: 73 MMHG | RESPIRATION RATE: 18 BRPM | WEIGHT: 164.8 LBS | BODY MASS INDEX: 24.55 KG/M2 | OXYGEN SATURATION: 98 % | HEART RATE: 74 BPM | TEMPERATURE: 98.3 F

## 2024-10-09 DIAGNOSIS — C17.1 MALIGNANT NEOPLASM OF JEJUNUM (H): Primary | ICD-10-CM

## 2024-10-09 LAB
ALBUMIN SERPL BCG-MCNC: 4 G/DL (ref 3.5–5.2)
ALP SERPL-CCNC: 63 U/L (ref 40–150)
ALT SERPL W P-5'-P-CCNC: 17 U/L (ref 0–70)
ANION GAP SERPL CALCULATED.3IONS-SCNC: 9 MMOL/L (ref 7–15)
AST SERPL W P-5'-P-CCNC: 25 U/L (ref 0–45)
BILIRUB SERPL-MCNC: 0.5 MG/DL
BUN SERPL-MCNC: 17.6 MG/DL (ref 8–23)
CALCIUM SERPL-MCNC: 8.6 MG/DL (ref 8.8–10.4)
CHLORIDE SERPL-SCNC: 101 MMOL/L (ref 98–107)
CREAT SERPL-MCNC: 1.06 MG/DL (ref 0.67–1.17)
EGFRCR SERPLBLD CKD-EPI 2021: 72 ML/MIN/1.73M2
GLUCOSE SERPL-MCNC: 112 MG/DL (ref 70–99)
HCO3 SERPL-SCNC: 27 MMOL/L (ref 22–29)
POTASSIUM SERPL-SCNC: 4.1 MMOL/L (ref 3.4–5.3)
PROT SERPL-MCNC: 6.1 G/DL (ref 6.4–8.3)
SODIUM SERPL-SCNC: 137 MMOL/L (ref 135–145)
TSH SERPL DL<=0.005 MIU/L-ACNC: 0.45 UIU/ML (ref 0.3–4.2)

## 2024-10-09 PROCEDURE — 80048 BASIC METABOLIC PNL TOTAL CA: CPT | Performed by: INTERNAL MEDICINE

## 2024-10-09 PROCEDURE — 258N000003 HC RX IP 258 OP 636: Performed by: INTERNAL MEDICINE

## 2024-10-09 PROCEDURE — 96413 CHEMO IV INFUSION 1 HR: CPT

## 2024-10-09 PROCEDURE — 84443 ASSAY THYROID STIM HORMONE: CPT | Performed by: INTERNAL MEDICINE

## 2024-10-09 PROCEDURE — 36591 DRAW BLOOD OFF VENOUS DEVICE: CPT | Performed by: INTERNAL MEDICINE

## 2024-10-09 PROCEDURE — 250N000011 HC RX IP 250 OP 636: Mod: JZ | Performed by: INTERNAL MEDICINE

## 2024-10-09 RX ORDER — HEPARIN SODIUM (PORCINE) LOCK FLUSH IV SOLN 100 UNIT/ML 100 UNIT/ML
5 SOLUTION INTRAVENOUS
Status: DISCONTINUED | OUTPATIENT
Start: 2024-10-09 | End: 2024-10-09 | Stop reason: HOSPADM

## 2024-10-09 RX ORDER — HEPARIN SODIUM (PORCINE) LOCK FLUSH IV SOLN 100 UNIT/ML 100 UNIT/ML
5 SOLUTION INTRAVENOUS EVERY 8 HOURS
Status: DISCONTINUED | OUTPATIENT
Start: 2024-10-09 | End: 2024-10-09 | Stop reason: HOSPADM

## 2024-10-09 RX ADMIN — SODIUM CHLORIDE 480 MG: 9 INJECTION, SOLUTION INTRAVENOUS at 15:54

## 2024-10-09 RX ADMIN — Medication 5 ML: at 16:32

## 2024-10-09 RX ADMIN — Medication 5 ML: at 14:43

## 2024-10-09 ASSESSMENT — PAIN SCALES - GENERAL: PAINLEVEL: NO PAIN (0)

## 2024-10-09 NOTE — PATIENT INSTRUCTIONS
[facility] Triage and after hours / weekends / holidays:  [phone]    Please call the triage or after hours line if you experience a temperature greater than or equal to 100.4, shaking chills, have uncontrolled nausea, vomiting and/or diarrhea, dizziness, shortness of breath, chest pain, bleeding, unexplained bruising, or if you have any other new/concerning symptoms, questions or concerns.      If you are having any concerning symptoms or wish to speak to a provider before your next infusion visit, please call triage to notify them so we can adequately serve you.     If you need a refill on a narcotic prescription or other medication, please call before your infusion appointment.

## 2024-10-09 NOTE — NURSING NOTE
Chief Complaint   Patient presents with    Port Draw     Labs drawn via port by RN in lab, vitals taken.        Labs drawn via port by RN. Port accessed with 20g, 3/4in, power needle. Flushed with saline and heparin. Pt tolerated well. Vitals taken. Pt checked into next appt.     Marielena Salgado RN

## 2024-10-22 DIAGNOSIS — C17.1 MALIGNANT NEOPLASM OF JEJUNUM (H): Primary | ICD-10-CM

## 2024-11-20 ENCOUNTER — ANCILLARY PROCEDURE (OUTPATIENT)
Dept: CT IMAGING | Facility: CLINIC | Age: 79
End: 2024-11-20
Attending: INTERNAL MEDICINE
Payer: MEDICARE

## 2024-11-20 ENCOUNTER — LAB (OUTPATIENT)
Dept: LAB | Facility: CLINIC | Age: 79
End: 2024-11-20
Attending: INTERNAL MEDICINE
Payer: MEDICARE

## 2024-11-20 DIAGNOSIS — C17.1 MALIGNANT NEOPLASM OF JEJUNUM (H): ICD-10-CM

## 2024-11-20 DIAGNOSIS — E03.9 ACQUIRED HYPOTHYROIDISM: ICD-10-CM

## 2024-11-20 DIAGNOSIS — E03.9 ACQUIRED HYPOTHYROIDISM: Primary | ICD-10-CM

## 2024-11-20 LAB
ALBUMIN SERPL BCG-MCNC: 3.9 G/DL (ref 3.5–5.2)
ALP SERPL-CCNC: 60 U/L (ref 40–150)
ALT SERPL W P-5'-P-CCNC: 17 U/L (ref 0–70)
ANION GAP SERPL CALCULATED.3IONS-SCNC: 7 MMOL/L (ref 7–15)
AST SERPL W P-5'-P-CCNC: 24 U/L (ref 0–45)
BASOPHILS # BLD AUTO: 0.1 10E3/UL (ref 0–0.2)
BASOPHILS NFR BLD AUTO: 1 %
BILIRUB SERPL-MCNC: 0.5 MG/DL
BUN SERPL-MCNC: 17.6 MG/DL (ref 8–23)
CALCIUM SERPL-MCNC: 8.8 MG/DL (ref 8.8–10.4)
CHLORIDE SERPL-SCNC: 105 MMOL/L (ref 98–107)
CREAT BLD-MCNC: 1.1 MG/DL (ref 0.7–1.3)
CREAT SERPL-MCNC: 1 MG/DL (ref 0.67–1.17)
EGFRCR SERPLBLD CKD-EPI 2021: 77 ML/MIN/1.73M2
EGFRCR SERPLBLD CKD-EPI 2021: >60 ML/MIN/1.73M2
EOSINOPHIL # BLD AUTO: 0.1 10E3/UL (ref 0–0.7)
EOSINOPHIL NFR BLD AUTO: 1 %
ERYTHROCYTE [DISTWIDTH] IN BLOOD BY AUTOMATED COUNT: 14.5 % (ref 10–15)
GLUCOSE SERPL-MCNC: 93 MG/DL (ref 70–99)
HCO3 SERPL-SCNC: 27 MMOL/L (ref 22–29)
HCT VFR BLD AUTO: 39.7 % (ref 40–53)
HGB BLD-MCNC: 12.5 G/DL (ref 13.3–17.7)
IMM GRANULOCYTES # BLD: 0 10E3/UL
IMM GRANULOCYTES NFR BLD: 0 %
LYMPHOCYTES # BLD AUTO: 1.1 10E3/UL (ref 0.8–5.3)
LYMPHOCYTES NFR BLD AUTO: 12 %
MCH RBC QN AUTO: 27.4 PG (ref 26.5–33)
MCHC RBC AUTO-ENTMCNC: 31.5 G/DL (ref 31.5–36.5)
MCV RBC AUTO: 87 FL (ref 78–100)
MONOCYTES # BLD AUTO: 0.8 10E3/UL (ref 0–1.3)
MONOCYTES NFR BLD AUTO: 9 %
NEUTROPHILS # BLD AUTO: 6.6 10E3/UL (ref 1.6–8.3)
NEUTROPHILS NFR BLD AUTO: 76 %
NRBC # BLD AUTO: 0 10E3/UL
NRBC BLD AUTO-RTO: 0 /100
PLATELET # BLD AUTO: 184 10E3/UL (ref 150–450)
POTASSIUM SERPL-SCNC: 3.9 MMOL/L (ref 3.4–5.3)
PROT SERPL-MCNC: 6.1 G/DL (ref 6.4–8.3)
RBC # BLD AUTO: 4.57 10E6/UL (ref 4.4–5.9)
SODIUM SERPL-SCNC: 139 MMOL/L (ref 135–145)
TSH SERPL DL<=0.005 MIU/L-ACNC: 0.53 UIU/ML (ref 0.3–4.2)
WBC # BLD AUTO: 8.7 10E3/UL (ref 4–11)

## 2024-11-20 PROCEDURE — 74177 CT ABD & PELVIS W/CONTRAST: CPT | Mod: MG | Performed by: STUDENT IN AN ORGANIZED HEALTH CARE EDUCATION/TRAINING PROGRAM

## 2024-11-20 PROCEDURE — 80053 COMPREHEN METABOLIC PANEL: CPT

## 2024-11-20 PROCEDURE — 80053 COMPREHEN METABOLIC PANEL: CPT | Performed by: PATHOLOGY

## 2024-11-20 PROCEDURE — G1010 CDSM STANSON: HCPCS | Performed by: STUDENT IN AN ORGANIZED HEALTH CARE EDUCATION/TRAINING PROGRAM

## 2024-11-20 PROCEDURE — 250N000011 HC RX IP 250 OP 636: Performed by: INTERNAL MEDICINE

## 2024-11-20 PROCEDURE — 36415 COLL VENOUS BLD VENIPUNCTURE: CPT

## 2024-11-20 PROCEDURE — 71260 CT THORAX DX C+: CPT | Mod: MG | Performed by: STUDENT IN AN ORGANIZED HEALTH CARE EDUCATION/TRAINING PROGRAM

## 2024-11-20 PROCEDURE — 85025 COMPLETE CBC W/AUTO DIFF WBC: CPT

## 2024-11-20 PROCEDURE — 84443 ASSAY THYROID STIM HORMONE: CPT

## 2024-11-20 RX ORDER — HEPARIN SODIUM (PORCINE) LOCK FLUSH IV SOLN 100 UNIT/ML 100 UNIT/ML
5 SOLUTION INTRAVENOUS ONCE
Status: COMPLETED | OUTPATIENT
Start: 2024-11-20 | End: 2024-11-20

## 2024-11-20 RX ORDER — IOPAMIDOL 755 MG/ML
85 INJECTION, SOLUTION INTRAVASCULAR ONCE
Status: COMPLETED | OUTPATIENT
Start: 2024-11-20 | End: 2024-11-20

## 2024-11-20 RX ORDER — HEPARIN SODIUM (PORCINE) LOCK FLUSH IV SOLN 100 UNIT/ML 100 UNIT/ML
500 SOLUTION INTRAVENOUS ONCE
Status: COMPLETED | OUTPATIENT
Start: 2024-11-20 | End: 2024-11-20

## 2024-11-20 RX ADMIN — HEPARIN SODIUM (PORCINE) LOCK FLUSH IV SOLN 100 UNIT/ML 500 UNITS: 100 SOLUTION at 12:50

## 2024-11-20 RX ADMIN — HEPARIN SODIUM (PORCINE) LOCK FLUSH IV SOLN 100 UNIT/ML 5 ML: 100 SOLUTION at 12:28

## 2024-11-20 RX ADMIN — IOPAMIDOL 85 ML: 755 INJECTION, SOLUTION INTRAVASCULAR at 12:42

## 2024-11-20 NOTE — NURSING NOTE
Chief Complaint   Patient presents with    Port Draw     Labs drawn via port     Port accessed with 20g flat needle by RN, labs collected, line flushed with saline and heparin.      Symone GLOVER RN PHN BSN  BMT/Oncology Lab

## 2024-11-20 NOTE — DISCHARGE INSTRUCTIONS

## 2024-11-25 ENCOUNTER — ONCOLOGY VISIT (OUTPATIENT)
Dept: ONCOLOGY | Facility: CLINIC | Age: 79
End: 2024-11-25
Attending: INTERNAL MEDICINE
Payer: MEDICARE

## 2024-11-25 VITALS
SYSTOLIC BLOOD PRESSURE: 147 MMHG | OXYGEN SATURATION: 99 % | DIASTOLIC BLOOD PRESSURE: 85 MMHG | WEIGHT: 167.5 LBS | BODY MASS INDEX: 24.95 KG/M2 | HEART RATE: 71 BPM | RESPIRATION RATE: 16 BRPM | TEMPERATURE: 98.2 F

## 2024-11-25 DIAGNOSIS — C17.1 MALIGNANT NEOPLASM OF JEJUNUM (H): Primary | ICD-10-CM

## 2024-11-25 DIAGNOSIS — E03.9 ACQUIRED HYPOTHYROIDISM: ICD-10-CM

## 2024-11-25 PROCEDURE — 99214 OFFICE O/P EST MOD 30 MIN: CPT | Performed by: INTERNAL MEDICINE

## 2024-11-25 PROCEDURE — G0463 HOSPITAL OUTPT CLINIC VISIT: HCPCS | Performed by: INTERNAL MEDICINE

## 2024-11-25 ASSESSMENT — PAIN SCALES - GENERAL: PAINLEVEL_OUTOF10: NO PAIN (0)

## 2024-11-25 NOTE — LETTER
11/25/2024      Demetrius Espinoza  [address] [address]      Dear Colleague,    Thank you for referring your patient, Demetrius Espinoza, to the [facility]. Please see a copy of my visit note below.          Robert Espinoza returns today in follow-up of metastatic small bowel carcinoma.    He is 79 years old and was diagnosed in the summer 2023 after presenting with iron deficiency and a small bowel obstruction.  His primary tumor was resected but he had an unresectable mesenteric mass presumably lymph nodes that was too proximal in the mesentery to resect.  He received 3 cycles of FOLFOX and Avastin and then after a finding of microsatellite instability was switched to ipilimumab and nivolumab.  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  He returns today for his next response assessment.    He reports that he is having more difficulty with generalized itching and aching joints though with no migdalia swelling.  He has been getting physical therapy for his fecal incontinence and hopes that is helping.  He does not have a lot of other active symptoms at present.    On exam today he appears well and younger than his stated age.  He has no obvious rash or swelling of his joints.    I personally reviewed his CT scan which shows no clear evidence of disease including resolution of a peritoneal nodule we had noted at our last evaluation.      Is normal electrolytes and renal function.  His bilirubin, albumin and liver enzymes are normal.  His blood counts are nearly normal with very slight normocytic anemia.  His TSH is  normal.    Assessment/plan: Small bowel carcinoma with microsatellite instability with a complete response, currently on single agent nivolumab.  He is decided he wants to discontinue treatment as he presumes his achiness and rash are related and does not see value to continue on with that.  He also would like to visit with an endocrinologist (he identified a specific endocrinologist at [facility], Dr. Briggs he would like to see,) to help work through how much he needs to stay on his steroid and thyroid replacement.  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    Will go ahead and hold his treatment and see him back to reevaluate his disease status in about 3 months.  Will defer ongoing management of his endocrinopathies to endocrinology.  He also asked to have his port removed and we will arrange that for him.          Again, thank you for allowing me to participate in the care of your patient.      Sincerely,    Hima Fregoso MD

## 2024-11-25 NOTE — NURSING NOTE
"Oncology Rooming Note    November 25, 2024 1:54 PM   Demetrius Espinoza is a 79 year old male who presents for:    Chief Complaint   Patient presents with    Oncology Clinic Visit     Malignant neoplasm of jejunum     Initial Vitals: BP (!) 147/85 (BP Location: Right arm, Patient Position: Sitting, Cuff Size: Adult Regular)   Pulse 71   Temp 98.2  F (36.8  C) (Oral)   Resp 16   Wt 76 kg (167 lb 8 oz)   SpO2 99%   BMI 24.95 kg/m   Estimated body mass index is 24.95 kg/m  as calculated from the following:    Height as of 5/13/24: 1.745 m (5' 8.7\").    Weight as of this encounter: 76 kg (167 lb 8 oz). Body surface area is 1.92 meters squared.  No Pain (0) Comment: Data Unavailable   No LMP for male patient.  Allergies reviewed: Yes  Medications reviewed: Yes    Medications: Medication refills not needed today.  Pharmacy name entered into [employer]: [id number] [place], [place] - [address]    Frailty Screening:   Is the patient here for a new oncology consult visit in cancer care? 2. No      Clinical concerns: Patient states no new concerns to discuss with provider.        Nancy Ernst, EMT            "

## 2024-11-25 NOTE — PROGRESS NOTES
Robert Espinoza returns today in follow-up of metastatic small bowel carcinoma.    He is 79 years old and was diagnosed in the summer 2023 after presenting with iron deficiency and a small bowel obstruction.  His primary tumor was resected but he had an unresectable mesenteric mass presumably lymph nodes that was too proximal in the mesentery to resect.  He received 3 cycles of FOLFOX and Avastin and then after a finding of microsatellite instability was switched to ipilimumab and nivolumab.  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  He returns today for his next response assessment.    He reports that he is having more difficulty with generalized itching and aching joints though with no migdalia swelling.  He has been getting physical therapy for his fecal incontinence and hopes that is helping.  He does not have a lot of other active symptoms at present.    On exam today he appears well and younger than his stated age.  He has no obvious rash or swelling of his joints.    I personally reviewed his CT scan which shows no clear evidence of disease including resolution of a peritoneal nodule we had noted at our last evaluation.      Is normal electrolytes and renal function.  His bilirubin, albumin and liver enzymes are normal.  His blood counts are nearly normal with very slight normocytic anemia.  His TSH is normal.    Assessment/plan: Small bowel carcinoma with microsatellite instability with a complete response, currently on single agent nivolumab.  He is decided he wants to discontinue treatment as he presumes his achiness and rash are related and does not see  value to continue on with that.  He also would like to visit with an endocrinologist (he identified a specific endocrinologist at [facility], Dr. Briggs he would like to see,) to help work through how much he needs to stay on his steroid and thyroid replacement.  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    Will go ahead and hold his treatment and see him back to reevaluate his disease status in about 3 months.  Will defer ongoing management of his endocrinopathies to endocrinology.  He also asked to have his port removed and we will arrange that for him.

## 2024-11-26 ENCOUNTER — PATIENT OUTREACH (OUTPATIENT)
Dept: ONCOLOGY | Facility: CLINIC | Age: 79
End: 2024-11-26
Payer: MEDICARE

## 2024-11-26 NOTE — PROGRESS NOTES
[facility]: Cancer Care                                                                                        Received voicemail from patient stating he has had trouble with multiple calls from referral placed yesterday. He would like to see Endocrinologist, Dr. Briggs. Dr Fregoso placed referral. RNCC faxed to [phone]    Patient also states he received call to schedule port removal but was told information for referral was not complete. Oh chart review, it appears IR is needing to know where and when patient had port placed. Patient moved to [place] from [place], where he was previously receiving his cancer care.     Placed return call to patient. Unable to reach. Left detailed message with above information. Asked patient to return call if he continues to have difficulty with referral scheduling.        Reena López RN, BSN, OCN   RN Care Coordinator   [facility]

## 2024-12-17 ENCOUNTER — TELEPHONE (OUTPATIENT)
Dept: INTERVENTIONAL RADIOLOGY/VASCULAR | Facility: HOSPITAL | Age: 79
End: 2024-12-17
Payer: MEDICARE

## 2024-12-17 NOTE — PROGRESS NOTES
"  Interventional Radiology - Pre-Procedure Evaluation:  Outpatient - [facility]  12/18/2024     Procedure Requested: port removal  Requested by: Dr. Fregoso    History and Physical Reviewed: H&P documented within 30 days (by Dr. Cason on 12/16/24). I have personally reviewed the patient's medical history and have updated the medical record as necessary.    HPI: Demetrius Espinoza is a 79 year old male with hx DVT, small bowel adenocarcinoma, residual mesenteric mass s/p ex lap on immunotherapy. Most recent oncology visit 11/25/24 patient noted to have complete response and opting to discontinue treatment related to achiness and rash; presumed to be r/t Nivolumab.    Patient states port previously placed in [place] 7/27/23, is obtaining operative note.    Presents today for port removal.    NPO: since [place] ANTICOAGULANTS/ANTIPLATELETS: Eliquis - no hold required for procedure  ANTIBIOTICS: none indicated for routine procedure    ALLERGIES:  No Known Allergies      LABS:  No results found for: \"INR\"   Hemoglobin   Date Value Ref Range Status   11/20/2024 12.5 (L) 13.3 - 17.7 g/dL Final     Platelet Count   Date Value Ref Range Status   11/20/2024 184 150 - 450 10e3/uL Final     Creatinine   Date Value Ref Range Status   11/20/2024 1.00 0.67 - 1.17 mg/dL Final     Creatinine POCT   Date Value Ref Range Status   11/20/2024 1.1 0.7 - 1.3 mg/dL Final     Potassium   Date Value Ref Range Status   11/20/2024 3.9 3.4 - 5.3 mmol/L Final         EXAM:  BP (!) 163/77   Pulse 72   Temp 97.4  F (36.3  C) (Oral)   Resp 18   SpO2 100%   General: Stable. In no acute distress.    Neuro: Alert and oriented x 3. No focal deficits.  Psych: Appropriate mood and affect. Linear/coherent thought process.   Resp: Normal respirations. Lungs clear to auscultation bilaterally.  Cardio: S1S2, regular rate and rhythm, without murmur, clicks or rubs  Abdomen: Soft, non-distended, non-tender.  Vascular: Right side port " CDI. Well healed    Skin: Warm and dry. Without excoriations, ecchymosis, erythema, lesions or open sores on right side chest.      PRE-SEDATION ASSESSMENT:    Code Status: FULL CODE        ASSESSMENT/PLAN:   Right sided port removal with fentanyl/local only    Procedural education reviewed with patient/family in detail including, but not limited to risks, benefits and alternatives with understanding verbalized by patient/family.    Total time spent on the date of the encounter: 45 minutes.      KELLIE CHIN CNP on 12/18/2024 at 2:03 PM   Interventional Radiology

## 2024-12-17 NOTE — TELEPHONE ENCOUNTER
Pre-operative physical documentation noted in Care Everywhere.  Aryan Cason, DO - 12/16/2024 9:40 AM CST     Flaca LANGE  Interventional Radiology RN

## 2024-12-18 ENCOUNTER — HOSPITAL ENCOUNTER (OUTPATIENT)
Dept: INTERVENTIONAL RADIOLOGY/VASCULAR | Facility: HOSPITAL | Age: 79
Discharge: HOME | End: 2024-12-18
Attending: INTERNAL MEDICINE | Admitting: RADIOLOGY
Payer: MEDICARE

## 2024-12-18 VITALS
TEMPERATURE: 97.4 F | HEART RATE: 78 BPM | OXYGEN SATURATION: 99 % | SYSTOLIC BLOOD PRESSURE: 141 MMHG | RESPIRATION RATE: 18 BRPM | DIASTOLIC BLOOD PRESSURE: 80 MMHG

## 2024-12-18 DIAGNOSIS — E03.9 ACQUIRED HYPOTHYROIDISM: ICD-10-CM

## 2024-12-18 DIAGNOSIS — C17.1 MALIGNANT NEOPLASM OF JEJUNUM (H): ICD-10-CM

## 2024-12-18 PROCEDURE — 250N000009 HC RX 250: Performed by: NURSE PRACTITIONER

## 2024-12-18 PROCEDURE — 250N000011 HC RX IP 250 OP 636: Performed by: NURSE PRACTITIONER

## 2024-12-18 PROCEDURE — 36590 REMOVAL TUNNELED CV CATH: CPT

## 2024-12-18 RX ORDER — NALOXONE HYDROCHLORIDE 0.4 MG/ML
0.4 INJECTION, SOLUTION INTRAMUSCULAR; INTRAVENOUS; SUBCUTANEOUS
Status: DISCONTINUED | OUTPATIENT
Start: 2024-12-18 | End: 2024-12-19 | Stop reason: HOSPADM

## 2024-12-18 RX ORDER — HEPARIN SODIUM 200 [USP'U]/100ML
1 INJECTION, SOLUTION INTRAVENOUS EVERY 5 MIN PRN
Status: DISCONTINUED | OUTPATIENT
Start: 2024-12-18 | End: 2024-12-19 | Stop reason: HOSPADM

## 2024-12-18 RX ORDER — LIDOCAINE 40 MG/G
CREAM TOPICAL
Status: DISCONTINUED | OUTPATIENT
Start: 2024-12-18 | End: 2024-12-19 | Stop reason: HOSPADM

## 2024-12-18 RX ORDER — NALOXONE HYDROCHLORIDE 0.4 MG/ML
0.2 INJECTION, SOLUTION INTRAMUSCULAR; INTRAVENOUS; SUBCUTANEOUS
Status: DISCONTINUED | OUTPATIENT
Start: 2024-12-18 | End: 2024-12-19 | Stop reason: HOSPADM

## 2024-12-18 RX ORDER — FENTANYL CITRATE 50 UG/ML
25-50 INJECTION, SOLUTION INTRAMUSCULAR; INTRAVENOUS EVERY 5 MIN PRN
Status: DISCONTINUED | OUTPATIENT
Start: 2024-12-18 | End: 2024-12-19 | Stop reason: HOSPADM

## 2024-12-18 RX ADMIN — FENTANYL CITRATE 25 MCG: 50 INJECTION, SOLUTION INTRAMUSCULAR; INTRAVENOUS at 14:30

## 2024-12-18 RX ADMIN — FENTANYL CITRATE 50 MCG: 50 INJECTION, SOLUTION INTRAMUSCULAR; INTRAVENOUS at 14:20

## 2024-12-18 RX ADMIN — LIDOCAINE HYDROCHLORIDE 10 ML: 10 INJECTION, SOLUTION INFILTRATION; PERINEURAL at 14:31

## 2024-12-18 NOTE — DISCHARGE INSTRUCTIONS
Port Removal Discharge Instructions:  You had your port-a-catheter removed today. Please follow the below instructions following your port removal:    Care Instructions:  - Avoid tub baths, Jacuzzi and pool soaks for 10 days.  - You may shower beginning tomorrow. Do not scrub site until well healed; pat dry.  - If you experience significant bleeding at site, apply pressure with hands above the clavicle bone, sit upright.    Seek medical assistance for any of the following:   - Uncontrolled bleeding.  - You have a fever (greater than 101 F (38.3C)).  - Purulent (yellow/green/foul smelling) drainage from previous catheter insertion site.  - Increasing redness at previous catheter insertion site.  - Increasing pain at previous catheter insertion site.  - Increasing swelling at previous catheter insertion site.    Contact [facility] IR RN Line at [phone] with questions or concerns.

## 2024-12-18 NOTE — SEDATION DOCUMENTATION
Patient Name: Demetrius Espinoza  Medical Record Number: [id number]  Today's Date: 12/18/2024    Procedure: Port Removal  Proceduralist: Dr. Guy    Procedure Start: 14:29  Procedure end: 14:42  Sedation medications administered:  75 mcg fentanyl       Other Notes: Pt arrived to IR room 2 from IR holding. Consent reviewed. Pt denies any questions or concerns regarding procedure. Pt positioned supine and monitored per protocol. Pt tolerated procedure without any noted complications. VSS. Pt transferred back to IR holding.

## 2024-12-18 NOTE — PROCEDURES
RADIOLOGY POST PROCEDURE NOTE WITH SEDATION  Patient name: Demetrius Espinoza  MRN: [id number]  : 1945    Pre-procedure diagnosis: Adenocarcinoma  Post-procedure diagnosis: Same    Procedure Date/Time: 2024  3:28 PM    Procedure: Port-a-cath placement  Estimated blood loss: Minimal  Sedation: Moderate sedation was employed. The patient was monitored by a nurse at all times during the procedure under my direct supervision.    Specimen(s) collected with description: None    I determined this patient to be an appropriate candidate for the planned sedation and procedure and reassessed the patient IMMEDIATELY PRIOR to sedation and procedure.     The patient tolerated the procedure well with no immediate complications.    Significant findings: Successful placement of port-a-cath. Ready for immediate use.    See imaging dictation for procedural details.    Provider name: Rodolfo Guy M.D.  Assistant(s):None

## 2024-12-18 NOTE — PROGRESS NOTES
Reviewed discharge education with patient. Pt verbalizes understanding of discharge instructions. Denies pain at time of discharge, site clean and dry.  Pt ambulatory at time of discharge with daughter as his .

## 2025-02-03 DIAGNOSIS — I82.409 DVT (DEEP VENOUS THROMBOSIS) (H): Primary | ICD-10-CM

## 2025-02-21 ENCOUNTER — ANCILLARY PROCEDURE (OUTPATIENT)
Dept: CT IMAGING | Facility: CLINIC | Age: 80
End: 2025-02-21
Attending: INTERNAL MEDICINE
Payer: MEDICARE

## 2025-02-21 ENCOUNTER — LAB (OUTPATIENT)
Dept: LAB | Facility: CLINIC | Age: 80
End: 2025-02-21
Attending: INTERNAL MEDICINE
Payer: MEDICARE

## 2025-02-21 DIAGNOSIS — C17.1 MALIGNANT NEOPLASM OF JEJUNUM (H): ICD-10-CM

## 2025-02-21 DIAGNOSIS — E03.9 ACQUIRED HYPOTHYROIDISM: ICD-10-CM

## 2025-02-21 LAB
ALBUMIN SERPL BCG-MCNC: 4.1 G/DL (ref 3.5–5.2)
ALP SERPL-CCNC: 77 U/L (ref 40–150)
ALT SERPL W P-5'-P-CCNC: 20 U/L (ref 0–70)
ANION GAP SERPL CALCULATED.3IONS-SCNC: 7 MMOL/L (ref 7–15)
AST SERPL W P-5'-P-CCNC: 32 U/L (ref 0–45)
BASOPHILS # BLD AUTO: 0.1 10E3/UL (ref 0–0.2)
BASOPHILS NFR BLD AUTO: 1 %
BILIRUB SERPL-MCNC: 0.6 MG/DL
BUN SERPL-MCNC: 23.8 MG/DL (ref 8–23)
CALCIUM SERPL-MCNC: 9.2 MG/DL (ref 8.8–10.4)
CEA SERPL-MCNC: 0.6 NG/ML
CHLORIDE SERPL-SCNC: 101 MMOL/L (ref 98–107)
CORTIS SERPL-MCNC: 9.7 UG/DL
CREAT BLD-MCNC: 1.3 MG/DL (ref 0.7–1.3)
CREAT SERPL-MCNC: 1.2 MG/DL (ref 0.67–1.17)
EGFRCR SERPLBLD CKD-EPI 2021: 56 ML/MIN/1.73M2
EGFRCR SERPLBLD CKD-EPI 2021: 62 ML/MIN/1.73M2
EOSINOPHIL # BLD AUTO: 0.1 10E3/UL (ref 0–0.7)
EOSINOPHIL NFR BLD AUTO: 1 %
ERYTHROCYTE [DISTWIDTH] IN BLOOD BY AUTOMATED COUNT: 14 % (ref 10–15)
GLUCOSE SERPL-MCNC: 83 MG/DL (ref 70–99)
HCO3 SERPL-SCNC: 28 MMOL/L (ref 22–29)
HCT VFR BLD AUTO: 37.9 % (ref 40–53)
HGB BLD-MCNC: 12.3 G/DL (ref 13.3–17.7)
IMM GRANULOCYTES # BLD: 0 10E3/UL
IMM GRANULOCYTES NFR BLD: 0 %
LYMPHOCYTES # BLD AUTO: 1 10E3/UL (ref 0.8–5.3)
LYMPHOCYTES NFR BLD AUTO: 13 %
MCH RBC QN AUTO: 27.6 PG (ref 26.5–33)
MCHC RBC AUTO-ENTMCNC: 32.5 G/DL (ref 31.5–36.5)
MCV RBC AUTO: 85 FL (ref 78–100)
MONOCYTES # BLD AUTO: 0.8 10E3/UL (ref 0–1.3)
MONOCYTES NFR BLD AUTO: 10 %
NEUTROPHILS # BLD AUTO: 5.9 10E3/UL (ref 1.6–8.3)
NEUTROPHILS NFR BLD AUTO: 75 %
NRBC # BLD AUTO: 0 10E3/UL
NRBC BLD AUTO-RTO: 0 /100
PLATELET # BLD AUTO: 195 10E3/UL (ref 150–450)
POTASSIUM SERPL-SCNC: 4.2 MMOL/L (ref 3.4–5.3)
PROT SERPL-MCNC: 6.6 G/DL (ref 6.4–8.3)
RBC # BLD AUTO: 4.45 10E6/UL (ref 4.4–5.9)
SODIUM SERPL-SCNC: 136 MMOL/L (ref 135–145)
WBC # BLD AUTO: 7.9 10E3/UL (ref 4–11)

## 2025-02-21 PROCEDURE — 85014 HEMATOCRIT: CPT

## 2025-02-21 PROCEDURE — 80053 COMPREHEN METABOLIC PANEL: CPT | Performed by: PATHOLOGY

## 2025-02-21 PROCEDURE — 36415 COLL VENOUS BLD VENIPUNCTURE: CPT

## 2025-02-21 PROCEDURE — 71260 CT THORAX DX C+: CPT | Performed by: RADIOLOGY

## 2025-02-21 PROCEDURE — 74177 CT ABD & PELVIS W/CONTRAST: CPT | Performed by: RADIOLOGY

## 2025-02-21 PROCEDURE — 84155 ASSAY OF PROTEIN SERUM: CPT

## 2025-02-21 PROCEDURE — 85004 AUTOMATED DIFF WBC COUNT: CPT

## 2025-02-21 PROCEDURE — 80051 ELECTROLYTE PANEL: CPT

## 2025-02-21 PROCEDURE — 82378 CARCINOEMBRYONIC ANTIGEN: CPT

## 2025-02-21 PROCEDURE — 82533 TOTAL CORTISOL: CPT

## 2025-02-21 RX ORDER — IOPAMIDOL 755 MG/ML
87 INJECTION, SOLUTION INTRAVASCULAR ONCE
Status: COMPLETED | OUTPATIENT
Start: 2025-02-21 | End: 2025-02-21

## 2025-02-21 RX ADMIN — IOPAMIDOL 87 ML: 755 INJECTION, SOLUTION INTRAVASCULAR at 12:20

## 2025-02-21 NOTE — DISCHARGE INSTRUCTIONS
IOP much better today, continue gtts as directed, and keep next PO next week. What to Expect When Having Contrast  ______________________________________________________________    For Adults and Children    What should I expect during the test?  We'll inject contrast dye into a vein. Contrast is a liquid with iodine in it. It shows up on X-rays. As we inject the contrast dye, you/your child may:  Feel warm or hot  Notice a metal taste in the mouth  Have minor stomach upset  Feel slight pressure near the bladder (lower belly) or kidneys (middle of the back)  These feelings are normal and will last about 1 to 3 minutes.    What to watch for  Please tell us if you notice any of these problems during the test:  Sneezing  Itching, hives, or swelling in the face  Hoarse voice  Breathing problems  Other new symptoms  We will work to treat any problems right away.    Serious reactions are rare. They can include: Irregular heartbeat, seizures, kidney failure, shock, tissue damage at the needle site, or death.     What should I do at home?    Water intake  If your kidneys are healthy (no kidney problems), drink extra water the day you get home. Water helps clear the contrast from the body, which reduces possible stress on the kidneys.  Encourage children to drink extra water throughout the day.   Adults should drink 4 extra glasses a day.  The contrast will pass out of the body in urine (pee) over the next 24 hours (1 day). You won't feel this. Your urine won't change color.  If you have kidney problems and take metformin, drink 4 to 8 large glasses of water for the next  2 days (48 hours) if you are not on fluid restriction  .  Medicines  If your kidneys are healthy (no kidney problems),  keep taking your usual medicines, including metformin for diabetes (Glucophage or Glucovance).  If you have kidney problems and take metformin:   - Don't take metformin for 2 days (48 hours) after the exam.  Contact your physician to ask about taking or restarting Metformin medication.    Pain and swelling  If you  notice pain and swelling at the needle site, try these tips:  Keep your arm elevated.  Hold a padded ice pack on the area for 15 minutes, then remove it for 15 minutes. Repeat this over the next 24 hours (1 day).       Extra instructions:    Who should I call for medical help?     When to call the nurse line  Call [phone] ([phone]) for any of the problems listed below. Tell the nurse you had contrast and describe the symptoms.  Hives, swelling, blistering, skin color changes or other new problems within 2 days (48 hours) of the exam.  Any problems at the injection site, such as:   - Blistering  - Pain that is getting worse  - Skin blanching (turning lighter or changing color)  - Tingling or a loss of feeling  - Any problem that seems to be spreading  Call 9-1-1 if you have:  Wheezing  Trouble breathing                Did this handout help? [email or web address]

## 2025-02-21 NOTE — NURSING NOTE
Chief Complaint   Patient presents with    Blood Draw     Labs drawn from PIV placed by RN     No order available to draw cortisol level. Labs drawn from PIV placed by RN. Line flushed with saline.     Trini Aguilar RN

## 2025-02-27 ENCOUNTER — VIRTUAL VISIT (OUTPATIENT)
Dept: ONCOLOGY | Facility: CLINIC | Age: 80
End: 2025-02-27
Attending: INTERNAL MEDICINE
Payer: MEDICARE

## 2025-02-27 VITALS — WEIGHT: 155 LBS | HEIGHT: 69 IN | BODY MASS INDEX: 22.96 KG/M2

## 2025-02-27 DIAGNOSIS — E27.40 CHRONIC ADRENAL INSUFFICIENCY: Primary | ICD-10-CM

## 2025-02-27 DIAGNOSIS — E03.9 ACQUIRED HYPOTHYROIDISM: ICD-10-CM

## 2025-02-27 DIAGNOSIS — C17.1 MALIGNANT NEOPLASM OF JEJUNUM (H): ICD-10-CM

## 2025-02-27 ASSESSMENT — PAIN SCALES - GENERAL: PAINLEVEL_OUTOF10: MILD PAIN (2)

## 2025-02-27 NOTE — NURSING NOTE
Patient reviewed medications and allergies in [employer] during e-check in and said everything looked correct.      Current patient location: [address] [place] [address]    Is the patient currently in the state of [place]? YES    Visit mode: VIDEO    If the visit is dropped, the patient can be reconnected by:VIDEO VISIT: Text to cell phone:   Telephone Information:   Mobile [phone]    and VIDEO VISIT: Send to e-mail at: [email or web address]    Will anyone else be joining the visit? NO  (If patient encounters technical issues they should call [phone])    Are changes needed to the allergy or medication list? Pt declined med review    Are refills needed on medications prescribed by this physician? NO    Rooming Documentation:  Questionnaire(s) completed    Reason for visit: BRETT TELLES

## 2025-02-27 NOTE — PROGRESS NOTES
Virtual Visit Details    Type of service:  Video Visit   Video Start Time:  1101  Video End Time:1132    Originating Location (pt. Location): Home    Distant Location (provider location):  Off-site  Platform used for Video Visit: [employer]

## 2025-02-27 NOTE — LETTER
2/27/2025      Demetrius Espinoza  [address] [facility] [address]      Dear Colleague,    Thank you for referring your patient, Demetrius Espinoza, to the [facility]. Please see a copy of my visit note below.    Reason for Visit: seen in follow-up of metastatic small bowel carcinoma    Oncology HPI: Reviewed and updated from Dr. Fregoso's 11/25/24 note.    He is 79 years old and was diagnosed in the summer 2023 after presenting with iron deficiency and a small bowel obstruction. His primary tumor was resected but he had an unresectable mesenteric mass presumably lymph nodes that was too proximal in the mesentery to resect. He received 3 cycles of FOLFOX and Avastin and then after a finding of microsatellite instability was switched to ipilimumab and nivolumab. 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     He had a CR to treatment and in November, opted to discontinue immunotherapy    Interval history:   Art is seen for ongoing surveillance. He has been dealing with various health issues since we've seen him last fall. He developed compression fractures of the spine. [facility] and has followed up with his endocrinologist to start a bisphophonate. Is taking Calcium and Vit D. Was not recommended for rehab services, but has follow-up with them in a few weeks and will be reviewing that.   He also notes some issues with fecal incontinence that he mentions started before or near the time of his diagnosis. No urinary incontinence. No pelvic numbness, or extremity weakness.  The incontinence pre-dates his issues with the  compression fracture. He is seeing a pelvic  soon.  Continues to have itching of the skin and cracking of the fingernails  Has ongoing follow-up with endorcrine and is tapering doses of hydrocortisone and remains on thyroid replacement.    Current Outpatient Medications   Medication Sig Dispense Refill     apixaban ANTICOAGULANT (ELIQUIS) 5 MG tablet Take 1 tablet (5 mg) by mouth 2 times daily. 200 tablet 2     FLUoxetine (PROZAC) 10 MG capsule Take 1 capsule (10 mg) by mouth daily 30 capsule 11     hydrocortisone (CORTEF) 5 MG tablet Take 3 tablets (15 mg) by mouth 2 times daily 180 tablet 11     levothyroxine (SYNTHROID/LEVOTHROID) 50 MCG tablet Take 1 tablet (50 mcg) by mouth daily 90 tablet 4        No Known Allergies      Video physical exam  General: Patient appears well in no acute distress.   Resp: Appears to be breathing comfortably without accessory muscle usage, speaking in full sentences, no cough  Psych: affect engaged, alert and oriented   There were no vitals taken for this visit.  Wt Readings from Last 4 Encounters:   11/25/24 76 kg (167 lb 8 oz)   10/09/24 74.8 kg (164 lb 12.8 oz)   09/09/24 73.5 kg (162 lb 1.6 oz)   08/26/24 71.9 kg (158 lb 8 oz)         Labs:      Latest Reference Range & Units 02/21/25 12:03   Sodium 135 - 145 mmol/L 136   Potassium 3.4 - 5.3 mmol/L 4.2   Chloride 98 - 107 mmol/L 101   Carbon Dioxide (CO2) 22 - 29 mmol/L 28   Urea Nitrogen 8.0 - 23.0 mg/dL 23.8 (H)   Creatinine 0.67 - 1.17 mg/dL 1.20 (H)   GFR Estimate >60 mL/min/1.73m2 62   Calcium 8.8 - 10.4 mg/dL 9.2   Anion Gap 7 - 15 mmol/L 7   Albumin 3.5 - 5.2 g/dL 4.1   Protein Total 6.4 - 8.3 g/dL 6.6   Alkaline Phosphatase 40 - 150 U/L 77   ALT 0 - 70 U/L 20   AST 0 - 45 U/L 32   Bilirubin Total <=1.2 mg/dL 0.6   CEA ng/mL 0.6   Cortisol Serum ug/dL 9.7   Glucose 70 - 99 mg/dL 83   WBC 4.0 - 11.0 10e3/uL 7.9   Hemoglobin 13.3 - 17.7 g/dL 12.3 (L)   Hematocrit 40.0 - 53.0 % 37.9 (L)   Platelet Count 150 -  450 10e3/uL 195   RBC Count 4.40 - 5.90 10e6/uL 4.45   MCV 78 - 100 fL 85   MCH 26.5 - 33.0 pg 27.6   MCHC 31.5 - 36.5 g/dL 32.5   RDW 10.0 - 15.0 % 14.0   % Neutrophils % 75   % Lymphocytes % 13   % Monocytes % 10   % Eosinophils % 1   % Basophils % 1   Absolute Basophils 0.0 - 0.2 10e3/uL 0.1   Absolute Eosinophils 0.0 - 0.7 10e3/uL 0.1   Absolute Immature Granulocytes <=0.4 10e3/uL 0.0   Absolute Lymphocytes 0.8 - 5.3 10e3/uL 1.0   Absolute Monocytes 0.0 - 1.3 10e3/uL 0.8   % Immature Granulocytes % 0   Absolute Neutrophils 1.6 - 8.3 10e3/uL 5.9   Absolute NRBCs 10e3/uL 0.0   NRBCs per 100 WBC <1 /100 0   (H): Data is abnormally high  (L): Data is abnormally low    Imaging:     EXAM: CT CHEST/ABDOMEN/PELVIS WITH CONTRAST  LOCATION: [facility]  DATE: 02/21/2025     INDICATION: Malignant neoplasm of jejunum (H). Acquired hypothyroidism.  COMPARISON: 11/20/2024.  TECHNIQUE: CT scan of the chest, abdomen, and pelvis was performed following injection of IV contrast. Multiplanar reformats were obtained. Dose reduction techniques were used.   CONTRAST: Isovue 370, 87 cc.     FINDINGS:   LUNGS AND PLEURA: No effusions or acute airspace disease. No new worrisome airspace disease. Previously noted tiny nodules are unchanged.     MEDIASTINUM/AXILLAE: Moderate hiatal hernia is larger. No new adenopathy. Stable small lymph nodes. No acute mediastinal abnormality. Removal of right chest Port-A-Cath.     CORONARY ARTERY CALCIFICATION: Moderate.     HEPATOBILIARY: No new observation. No calcified gallstones.     PANCREAS: Normal.     SPLEEN: Normal.     ADRENAL GLANDS: Normal.     KIDNEYS/BLADDER: No significant mass, stones, or hydronephrosis. There are simple or benign cysts. No follow up is needed. Bladder unremarkable.     BOWEL: No obstruction or acute inflammation. A few minimal fluid-filled bowel loops again noted of doubtful clinical significance. Stable postoperative change.  Moderate stool. Normal appendix.     LYMPH NODES: Normal.     VASCULATURE: Scattered calcifications.     PELVIC ORGANS: No new pelvic lesion.     MUSCULOSKELETAL: No aggressive bone lesion. Interval demonstration of compression deformities at T12 and L1, series 7 image 50. Mild degenerative disc disease at L5-S1.                                                                      IMPRESSION:  1.  Interval development of compression deformities at T12 and L1 are technically age-indeterminate.  2.  No new disease otherwise seen.    Impression/plan:     Small bowel carcinoma, primary resected. He had a unresectable mesenteric mass and liliya disease that had a CR to treatment. He has been off of immunotherapy since November  -I reviewed his imaging and labs with him today. No specific findings to suggest recurrent disease. We discussed that he remains at risk of recurrence and recommended ongoing surveillance with labs and another CT in about 3 months. He isn't sure he wants more imaging as he is concerned about radiation exposure. Discussed the sensitivity the CT adds in picking up low volume recurrence, before he presents with symptoms. I reviewed his prior CEAs , but I don't see that he had a baseline elevation.  He was amenable to scheduling the scans and follow-up but is weighing his options and will let us know as the time approaches for his next visit.    Adrenal insufficiency  Hypothyroidism  -s/t immunotherapy. Following with endocrine. On hydrocortisone and thyroid replacement    Osteoporosis/ compression fracture  -has been following with [facility] and endocrine, starting risedronate    TAD  -mild, appears consistent with mild dehydration, advised to push oral hydration. Plan to recheck with our next set of labs    DVT, LUE, diagnosed after his cancer diagnosis. Now with a CR, but remains at high risk of recurrence  -recommended he continue eliquis due to risk of cancer recurrence. He prefers to  continue it now as well     Fecal incontinence  -notes that symptoms predate his compression fractures, Discussed whether he has had MRI imaging of the spine. He hasn't and will discuss that with his spine team and pelvic floor consultant.      The longitudinal plan of care for the diagnosis(es)/condition(s) as documented were addressed during this visit. Due to the added complexity in care, I will continue to support Art in the subsequent management and with ongoing continuity of care.          Virtual Visit Details    Type of service:  Video Visit   Video Start Time:  1101  Video End Time:1132    Originating Location (pt. Location): Home  {PROVIDER LOCATION On-site should be selected for visits conducted from your clinic location or adjoining [facility] hospital, academic office, or other nearby [facility] building. Off-site should be selected for all other provider locations, including home:[id number]}  Distant Location (provider location):  Off-site  Platform used for Video Visit: [employer]      Again, thank you for allowing me to participate in the care of your patient.        Sincerely,        KELLIE Hendrix CNP    Electronically signed

## 2025-02-27 NOTE — PROGRESS NOTES
Reason for Visit: seen in follow-up of metastatic small bowel carcinoma    Oncology HPI: Reviewed and updated from Dr. Fregoso's 11/25/24 note.    He is 79 years old and was diagnosed in the summer 2023 after presenting with iron deficiency and a small bowel obstruction. His primary tumor was resected but he had an unresectable mesenteric mass presumably lymph nodes that was too proximal in the mesentery to resect. He received 3 cycles of FOLFOX and Avastin and then after a finding of microsatellite instability was switched to ipilimumab and nivolumab. 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     He had a CR to treatment and in November, opted to discontinue immunotherapy    Interval history:   Art is seen for ongoing surveillance. He has been dealing with various health issues since we've seen him last fall. He developed compression fractures of the spine. [facility] and has followed up with his endocrinologist to start a bisphophonate. Is taking Calcium and Vit D. Was not recommended for rehab services, but has follow-up with them in a few weeks and will be reviewing that.   He also notes some issues with fecal incontinence that he mentions started before or near the time of his diagnosis. No urinary incontinence. No pelvic numbness, or extremity weakness.  The incontinence pre-dates his issues with the compression fracture. He is seeing a pelvic  soon.  Continues to have itching of the skin and cracking of the fingernails  Has ongoing follow-up with endorcrine and is tapering doses of hydrocortisone and remains on thyroid  replacement.    Current Outpatient Medications   Medication Sig Dispense Refill    apixaban ANTICOAGULANT (ELIQUIS) 5 MG tablet Take 1 tablet (5 mg) by mouth 2 times daily. 200 tablet 2    FLUoxetine (PROZAC) 10 MG capsule Take 1 capsule (10 mg) by mouth daily 30 capsule 11    hydrocortisone (CORTEF) 5 MG tablet Take 3 tablets (15 mg) by mouth 2 times daily 180 tablet 11    levothyroxine (SYNTHROID/LEVOTHROID) 50 MCG tablet Take 1 tablet (50 mcg) by mouth daily 90 tablet 4        No Known Allergies      Video physical exam  General: Patient appears well in no acute distress.   Resp: Appears to be breathing comfortably without accessory muscle usage, speaking in full sentences, no cough  Psych: affect engaged, alert and oriented   There were no vitals taken for this visit.  Wt Readings from Last 4 Encounters:   11/25/24 76 kg (167 lb 8 oz)   10/09/24 74.8 kg (164 lb 12.8 oz)   09/09/24 73.5 kg (162 lb 1.6 oz)   08/26/24 71.9 kg (158 lb 8 oz)         Labs:      Latest Reference Range & Units 02/21/25 12:03   Sodium 135 - 145 mmol/L 136   Potassium 3.4 - 5.3 mmol/L 4.2   Chloride 98 - 107 mmol/L 101   Carbon Dioxide (CO2) 22 - 29 mmol/L 28   Urea Nitrogen 8.0 - 23.0 mg/dL 23.8 (H)   Creatinine 0.67 - 1.17 mg/dL 1.20 (H)   GFR Estimate >60 mL/min/1.73m2 62   Calcium 8.8 - 10.4 mg/dL 9.2   Anion Gap 7 - 15 mmol/L 7   Albumin 3.5 - 5.2 g/dL 4.1   Protein Total 6.4 - 8.3 g/dL 6.6   Alkaline Phosphatase 40 - 150 U/L 77   ALT 0 - 70 U/L 20   AST 0 - 45 U/L 32   Bilirubin Total <=1.2 mg/dL 0.6   CEA ng/mL 0.6   Cortisol Serum ug/dL 9.7   Glucose 70 - 99 mg/dL 83   WBC 4.0 - 11.0 10e3/uL 7.9   Hemoglobin 13.3 - 17.7 g/dL 12.3 (L)   Hematocrit 40.0 - 53.0 % 37.9 (L)   Platelet Count 150 - 450 10e3/uL 195   RBC Count 4.40 - 5.90 10e6/uL 4.45   MCV 78 - 100 fL 85   MCH 26.5 - 33.0 pg 27.6   MCHC 31.5 - 36.5 g/dL 32.5   RDW 10.0 - 15.0 % 14.0   % Neutrophils % 75   % Lymphocytes % 13   % Monocytes % 10   % Eosinophils % 1   %  Basophils % 1   Absolute Basophils 0.0 - 0.2 10e3/uL 0.1   Absolute Eosinophils 0.0 - 0.7 10e3/uL 0.1   Absolute Immature Granulocytes <=0.4 10e3/uL 0.0   Absolute Lymphocytes 0.8 - 5.3 10e3/uL 1.0   Absolute Monocytes 0.0 - 1.3 10e3/uL 0.8   % Immature Granulocytes % 0   Absolute Neutrophils 1.6 - 8.3 10e3/uL 5.9   Absolute NRBCs 10e3/uL 0.0   NRBCs per 100 WBC <1 /100 0   (H): Data is abnormally high  (L): Data is abnormally low    Imaging:     EXAM: CT CHEST/ABDOMEN/PELVIS WITH CONTRAST  LOCATION: [facility]  DATE: 02/21/2025     INDICATION: Malignant neoplasm of jejunum (H). Acquired hypothyroidism.  COMPARISON: 11/20/2024.  TECHNIQUE: CT scan of the chest, abdomen, and pelvis was performed following injection of IV contrast. Multiplanar reformats were obtained. Dose reduction techniques were used.   CONTRAST: Isovue 370, 87 cc.     FINDINGS:   LUNGS AND PLEURA: No effusions or acute airspace disease. No new worrisome airspace disease. Previously noted tiny nodules are unchanged.     MEDIASTINUM/AXILLAE: Moderate hiatal hernia is larger. No new adenopathy. Stable small lymph nodes. No acute mediastinal abnormality. Removal of right chest Port-A-Cath.     CORONARY ARTERY CALCIFICATION: Moderate.     HEPATOBILIARY: No new observation. No calcified gallstones.     PANCREAS: Normal.     SPLEEN: Normal.     ADRENAL GLANDS: Normal.     KIDNEYS/BLADDER: No significant mass, stones, or hydronephrosis. There are simple or benign cysts. No follow up is needed. Bladder unremarkable.     BOWEL: No obstruction or acute inflammation. A few minimal fluid-filled bowel loops again noted of doubtful clinical significance. Stable postoperative change. Moderate stool. Normal appendix.     LYMPH NODES: Normal.     VASCULATURE: Scattered calcifications.     PELVIC ORGANS: No new pelvic lesion.     MUSCULOSKELETAL: No aggressive bone lesion. Interval demonstration of compression  deformities at T12 and L1, series 7 image 50. Mild degenerative disc disease at L5-S1.                                                                      IMPRESSION:  1.  Interval development of compression deformities at T12 and L1 are technically age-indeterminate.  2.  No new disease otherwise seen.    Impression/plan:     Small bowel carcinoma, primary resected. He had a unresectable mesenteric mass and liliya disease that had a CR to treatment. He has been off of immunotherapy since November  -I reviewed his imaging and labs with him today. No specific findings to suggest recurrent disease. We discussed that he remains at risk of recurrence and recommended ongoing surveillance with labs and another CT in about 3 months. He isn't sure he wants more imaging as he is concerned about radiation exposure. Discussed the sensitivity the CT adds in picking up low volume recurrence, before he presents with symptoms. I reviewed his prior CEAs , but I don't see that he had a baseline elevation.  He was amenable to scheduling the scans and follow-up but is weighing his options and will let us know as the time approaches for his next visit.    Adrenal insufficiency  Hypothyroidism  -s/t immunotherapy. Following with endocrine. On hydrocortisone and thyroid replacement    Osteoporosis/ compression fracture  -has been following with [facility] and endocrine, starting risedronate    TAD  -mild, appears consistent with mild dehydration, advised to push oral hydration. Plan to recheck with our next set of labs    DVT, LUE, diagnosed after his cancer diagnosis. Now with a CR, but remains at high risk of recurrence  -recommended he continue eliquis due to risk of cancer recurrence. He prefers to continue it now as well     Fecal incontinence  -notes that symptoms predate his compression fractures, Discussed whether he has had MRI imaging of the spine. He hasn't and will discuss that with his spine team and pelvic floor  consultant.      The longitudinal plan of care for the diagnosis(es)/condition(s) as documented were addressed during this visit. Due to the added complexity in care, I will continue to support Art in the subsequent management and with ongoing continuity of care.

## 2025-04-12 ENCOUNTER — HEALTH MAINTENANCE LETTER (OUTPATIENT)
Age: 80
End: 2025-04-12

## 2025-05-23 PROCEDURE — 82378 CARCINOEMBRYONIC ANTIGEN: CPT | Performed by: NURSE PRACTITIONER

## 2025-05-23 PROCEDURE — 99000 SPECIMEN HANDLING OFFICE-LAB: CPT | Performed by: PATHOLOGY

## 2025-06-01 DIAGNOSIS — E03.9 ACQUIRED HYPOTHYROIDISM: ICD-10-CM

## 2025-06-01 DIAGNOSIS — C17.1 MALIGNANT NEOPLASM OF JEJUNUM (H): ICD-10-CM

## 2025-06-02 DIAGNOSIS — E03.9 ACQUIRED HYPOTHYROIDISM: ICD-10-CM

## 2025-06-02 DIAGNOSIS — C17.1 MALIGNANT NEOPLASM OF JEJUNUM (H): ICD-10-CM

## 2025-06-02 RX ORDER — HYDROCORTISONE 5 MG/1
15 TABLET ORAL 2 TIMES DAILY
Qty: 180 TABLET | Refills: 11 | Status: SHIPPED | OUTPATIENT
Start: 2025-06-02

## 2025-06-03 RX ORDER — HYDROCORTISONE 5 MG/1
15 TABLET ORAL
Refills: 0 | OUTPATIENT
Start: 2025-06-03

## 2025-06-14 ENCOUNTER — HEALTH MAINTENANCE LETTER (OUTPATIENT)
Age: 80
End: 2025-06-14

## 2025-06-17 ENCOUNTER — TRANSFERRED RECORDS (OUTPATIENT)
Dept: HEALTH INFORMATION MANAGEMENT | Facility: CLINIC | Age: 80
End: 2025-06-17
Payer: MEDICARE

## (undated) RX ORDER — HEPARIN SODIUM (PORCINE) LOCK FLUSH IV SOLN 100 UNIT/ML 100 UNIT/ML
SOLUTION INTRAVENOUS
Status: DISPENSED
Start: 2024-11-20

## (undated) RX ORDER — FENTANYL CITRATE 50 UG/ML
INJECTION, SOLUTION INTRAMUSCULAR; INTRAVENOUS
Status: DISPENSED
Start: 2024-12-18

## (undated) RX ORDER — HEPARIN SODIUM (PORCINE) LOCK FLUSH IV SOLN 100 UNIT/ML 100 UNIT/ML
SOLUTION INTRAVENOUS
Status: DISPENSED
Start: 2024-05-21